# Patient Record
Sex: FEMALE | Race: ASIAN | Employment: OTHER | URBAN - METROPOLITAN AREA
[De-identification: names, ages, dates, MRNs, and addresses within clinical notes are randomized per-mention and may not be internally consistent; named-entity substitution may affect disease eponyms.]

---

## 2018-09-13 ENCOUNTER — HOSPITAL ENCOUNTER (OUTPATIENT)
Dept: PREADMISSION TESTING | Age: 78
Discharge: HOME | End: 2018-09-13
Payer: MEDICARE

## 2018-09-13 VITALS — BODY MASS INDEX: 19.24 KG/M2 | HEIGHT: 60 IN | WEIGHT: 98 LBS

## 2018-09-13 LAB
ALBUMIN SERPL-MCNC: 4.1 G/DL (ref 3.4–5)
ALBUMIN/GLOB SERPL: 1.1 {RATIO} (ref 0.8–1.7)
ALP SERPL-CCNC: 81 U/L (ref 45–117)
ALT SERPL-CCNC: 23 U/L (ref 13–56)
ANION GAP SERPL CALC-SCNC: 9 MMOL/L (ref 3–18)
APPEARANCE UR: ABNORMAL
APTT PPP: 34.5 SEC (ref 23–36.4)
AST SERPL-CCNC: 28 U/L (ref 15–37)
ATRIAL RATE: 75 BPM
BACTERIA SPEC CULT: NORMAL
BACTERIA URNS QL MICRO: ABNORMAL /HPF
BASOPHILS # BLD: 0 K/UL (ref 0–0.1)
BASOPHILS NFR BLD: 0 % (ref 0–2)
BILIRUB SERPL-MCNC: 0.3 MG/DL (ref 0.2–1)
BILIRUB UR QL: NEGATIVE
BUN SERPL-MCNC: 36 MG/DL (ref 7–18)
BUN/CREAT SERPL: 27 (ref 12–20)
CALCIUM SERPL-MCNC: 9.7 MG/DL (ref 8.5–10.1)
CALCULATED P AXIS, ECG09: 44 DEGREES
CALCULATED R AXIS, ECG10: 81 DEGREES
CALCULATED T AXIS, ECG11: 90 DEGREES
CHLORIDE SERPL-SCNC: 96 MMOL/L (ref 100–108)
CO2 SERPL-SCNC: 29 MMOL/L (ref 21–32)
COLOR UR: YELLOW
CREAT SERPL-MCNC: 1.34 MG/DL (ref 0.6–1.3)
DIAGNOSIS, 93000: NORMAL
DIFFERENTIAL METHOD BLD: ABNORMAL
EOSINOPHIL # BLD: 0.2 K/UL (ref 0–0.4)
EOSINOPHIL NFR BLD: 3 % (ref 0–5)
EPITH CASTS URNS QL MICRO: ABNORMAL /LPF (ref 0–5)
ERYTHROCYTE [DISTWIDTH] IN BLOOD BY AUTOMATED COUNT: 13 % (ref 11.6–14.5)
ERYTHROCYTE [SEDIMENTATION RATE] IN BLOOD: 70 MM/HR (ref 0–30)
EST. AVERAGE GLUCOSE BLD GHB EST-MCNC: 140 MG/DL
GLOBULIN SER CALC-MCNC: 3.9 G/DL (ref 2–4)
GLUCOSE SERPL-MCNC: 148 MG/DL (ref 74–99)
GLUCOSE UR STRIP.AUTO-MCNC: NEGATIVE MG/DL
HBA1C MFR BLD: 6.5 % (ref 4.5–5.6)
HCT VFR BLD AUTO: 28.5 % (ref 35–45)
HGB BLD-MCNC: 9.4 G/DL (ref 12–16)
HGB UR QL STRIP: NEGATIVE
INR PPP: 0.9 (ref 0.8–1.2)
KETONES UR QL STRIP.AUTO: NEGATIVE MG/DL
LEUKOCYTE ESTERASE UR QL STRIP.AUTO: ABNORMAL
LYMPHOCYTES # BLD: 2.3 K/UL (ref 0.9–3.6)
LYMPHOCYTES NFR BLD: 32 % (ref 21–52)
MCH RBC QN AUTO: 30.2 PG (ref 24–34)
MCHC RBC AUTO-ENTMCNC: 33 G/DL (ref 31–37)
MCV RBC AUTO: 91.6 FL (ref 74–97)
MONOCYTES # BLD: 0.5 K/UL (ref 0.05–1.2)
MONOCYTES NFR BLD: 7 % (ref 3–10)
NEUTS SEG # BLD: 4.1 K/UL (ref 1.8–8)
NEUTS SEG NFR BLD: 58 % (ref 40–73)
NITRITE UR QL STRIP.AUTO: POSITIVE
P-R INTERVAL, ECG05: 158 MS
PH UR STRIP: 8 [PH] (ref 5–8)
PLATELET # BLD AUTO: 293 K/UL (ref 135–420)
PMV BLD AUTO: 8.5 FL (ref 9.2–11.8)
POTASSIUM SERPL-SCNC: 5.1 MMOL/L (ref 3.5–5.5)
PROT SERPL-MCNC: 8 G/DL (ref 6.4–8.2)
PROT UR STRIP-MCNC: ABNORMAL MG/DL
PROTHROMBIN TIME: 11.8 SEC (ref 11.5–15.2)
Q-T INTERVAL, ECG07: 414 MS
QRS DURATION, ECG06: 120 MS
QTC CALCULATION (BEZET), ECG08: 462 MS
RBC # BLD AUTO: 3.11 M/UL (ref 4.2–5.3)
RBC #/AREA URNS HPF: ABNORMAL /HPF (ref 0–5)
SERVICE CMNT-IMP: NORMAL
SODIUM SERPL-SCNC: 134 MMOL/L (ref 136–145)
SP GR UR REFRACTOMETRY: 1.01 (ref 1–1.03)
UROBILINOGEN UR QL STRIP.AUTO: 0.2 EU/DL (ref 0.2–1)
VENTRICULAR RATE, ECG03: 75 BPM
WBC # BLD AUTO: 7.2 K/UL (ref 4.6–13.2)
WBC URNS QL MICRO: ABNORMAL /HPF (ref 0–5)
YEAST URNS QL MICRO: ABNORMAL

## 2018-09-13 PROCEDURE — 85610 PROTHROMBIN TIME: CPT | Performed by: ORTHOPAEDIC SURGERY

## 2018-09-13 PROCEDURE — 81001 URINALYSIS AUTO W/SCOPE: CPT | Performed by: ORTHOPAEDIC SURGERY

## 2018-09-13 PROCEDURE — 93005 ELECTROCARDIOGRAM TRACING: CPT

## 2018-09-13 PROCEDURE — 80053 COMPREHEN METABOLIC PANEL: CPT | Performed by: ORTHOPAEDIC SURGERY

## 2018-09-13 PROCEDURE — 85025 COMPLETE CBC W/AUTO DIFF WBC: CPT | Performed by: ORTHOPAEDIC SURGERY

## 2018-09-13 PROCEDURE — 85652 RBC SED RATE AUTOMATED: CPT | Performed by: ORTHOPAEDIC SURGERY

## 2018-09-13 PROCEDURE — 85730 THROMBOPLASTIN TIME PARTIAL: CPT | Performed by: ORTHOPAEDIC SURGERY

## 2018-09-13 PROCEDURE — 36415 COLL VENOUS BLD VENIPUNCTURE: CPT | Performed by: ORTHOPAEDIC SURGERY

## 2018-09-13 PROCEDURE — 87641 MR-STAPH DNA AMP PROBE: CPT | Performed by: ORTHOPAEDIC SURGERY

## 2018-09-13 PROCEDURE — 83036 HEMOGLOBIN GLYCOSYLATED A1C: CPT | Performed by: ORTHOPAEDIC SURGERY

## 2018-09-13 RX ORDER — CEFAZOLIN SODIUM/WATER 2 G/20 ML
2 SYRINGE (ML) INTRAVENOUS ONCE
Status: DISCONTINUED | OUTPATIENT
Start: 2018-09-13 | End: 2018-09-13

## 2018-09-13 RX ORDER — ACETAMINOPHEN 500 MG
500 TABLET ORAL
COMMUNITY

## 2018-09-13 RX ORDER — AMLODIPINE BESYLATE 5 MG/1
5 TABLET ORAL
COMMUNITY

## 2018-09-13 RX ORDER — CHOLECALCIFEROL TAB 125 MCG (5000 UNIT) 125 MCG
5000 TAB ORAL DAILY
COMMUNITY
End: 2018-10-25

## 2018-09-13 RX ORDER — ATORVASTATIN CALCIUM 40 MG/1
40 TABLET, FILM COATED ORAL
COMMUNITY

## 2018-09-13 RX ORDER — SODIUM CHLORIDE, SODIUM LACTATE, POTASSIUM CHLORIDE, CALCIUM CHLORIDE 600; 310; 30; 20 MG/100ML; MG/100ML; MG/100ML; MG/100ML
125 INJECTION, SOLUTION INTRAVENOUS CONTINUOUS
Status: CANCELLED | OUTPATIENT
Start: 2018-09-13

## 2018-09-13 RX ORDER — CEFAZOLIN SODIUM/WATER 2 G/20 ML
2 SYRINGE (ML) INTRAVENOUS ONCE
Status: CANCELLED | OUTPATIENT
Start: 2018-09-13 | End: 2018-09-13

## 2018-09-13 RX ORDER — BISMUTH SUBSALICYLATE 262 MG
1 TABLET,CHEWABLE ORAL DAILY
COMMUNITY

## 2018-09-13 RX ORDER — LOSARTAN POTASSIUM AND HYDROCHLOROTHIAZIDE 25; 100 MG/1; MG/1
1 TABLET ORAL DAILY
COMMUNITY

## 2018-09-13 NOTE — PERIOP NOTES
Offered interpretor and patient as well as daughter refused to use phone interpretor. Daughter did very well with helping pt. With language barrier and was told that at any time while at hospital she can or Mother (the patient ) can request an interpretor. They both said they understand and continued with PAT interview with daughter doing the translation.

## 2018-09-14 NOTE — PERIOP NOTES
Abnormal labs faxed to [address] [place] [address] office and Jackie Jack notified. Re: UA, creatinine, Hgb

## 2018-10-25 ENCOUNTER — HOSPITAL ENCOUNTER (OUTPATIENT)
Dept: PREADMISSION TESTING | Age: 78
Discharge: HOME | End: 2018-10-25
Payer: MEDICARE

## 2018-10-25 VITALS — WEIGHT: 98.25 LBS | HEIGHT: 60 IN | BODY MASS INDEX: 19.29 KG/M2

## 2018-10-25 LAB
ALBUMIN SERPL-MCNC: 3.9 G/DL (ref 3.4–5)
ALBUMIN/GLOB SERPL: 1 {RATIO} (ref 0.8–1.7)
ALP SERPL-CCNC: 78 U/L (ref 45–117)
ALT SERPL-CCNC: 12 U/L (ref 13–56)
ANION GAP SERPL CALC-SCNC: 7 MMOL/L (ref 3–18)
APPEARANCE UR: CLEAR
APTT PPP: 36.5 SEC (ref 23–36.4)
AST SERPL-CCNC: 17 U/L (ref 15–37)
BACTERIA SPEC CULT: NORMAL
BACTERIA URNS QL MICRO: ABNORMAL /HPF
BILIRUB SERPL-MCNC: 0.4 MG/DL (ref 0.2–1)
BILIRUB UR QL: NEGATIVE
BUN SERPL-MCNC: 27 MG/DL (ref 7–18)
BUN/CREAT SERPL: 25
CALCIUM SERPL-MCNC: 9.7 MG/DL (ref 8.5–10.1)
CHLORIDE SERPL-SCNC: 106 MMOL/L (ref 100–108)
CO2 SERPL-SCNC: 25 MMOL/L (ref 21–32)
COLOR UR: YELLOW
CREAT SERPL-MCNC: 1.09 MG/DL (ref 0.6–1.3)
EPITH CASTS URNS QL MICRO: ABNORMAL /LPF (ref 0–5)
ERYTHROCYTE [DISTWIDTH] IN BLOOD BY AUTOMATED COUNT: 13.8 % (ref 11.6–14.5)
ERYTHROCYTE [SEDIMENTATION RATE] IN BLOOD: 98 MM/HR (ref 0–30)
EST. AVERAGE GLUCOSE BLD GHB EST-MCNC: 128 MG/DL
GLOBULIN SER CALC-MCNC: 4.1 G/DL (ref 2–4)
GLUCOSE SERPL-MCNC: 187 MG/DL (ref 74–99)
GLUCOSE UR STRIP.AUTO-MCNC: NEGATIVE MG/DL
HBA1C MFR BLD: 6.1 % (ref 4.2–5.6)
HCT VFR BLD AUTO: 28.7 % (ref 35–45)
HGB BLD-MCNC: 9.4 G/DL (ref 12–16)
HGB UR QL STRIP: NEGATIVE
INR PPP: 1 (ref 0.8–1.2)
KETONES UR QL STRIP.AUTO: NEGATIVE MG/DL
LEUKOCYTE ESTERASE UR QL STRIP.AUTO: ABNORMAL
MCH RBC QN AUTO: 30.7 PG (ref 24–34)
MCHC RBC AUTO-ENTMCNC: 32.8 G/DL (ref 31–37)
MCV RBC AUTO: 93.8 FL (ref 74–97)
NITRITE UR QL STRIP.AUTO: POSITIVE
PH UR STRIP: 6.5 [PH] (ref 5–8)
PLATELET # BLD AUTO: 289 K/UL (ref 135–420)
PMV BLD AUTO: 9.2 FL (ref 9.2–11.8)
POTASSIUM SERPL-SCNC: 4.6 MMOL/L (ref 3.5–5.5)
PROT SERPL-MCNC: 8 G/DL (ref 6.4–8.2)
PROT UR STRIP-MCNC: NEGATIVE MG/DL
PROTHROMBIN TIME: 12.8 SEC (ref 11.5–15.2)
RBC # BLD AUTO: 3.06 M/UL (ref 4.2–5.3)
RBC #/AREA URNS HPF: NEGATIVE /HPF (ref 0–5)
SERVICE CMNT-IMP: NORMAL
SODIUM SERPL-SCNC: 138 MMOL/L (ref 136–145)
SP GR UR REFRACTOMETRY: 1.01 (ref 1–1.03)
UROBILINOGEN UR QL STRIP.AUTO: 0.2 EU/DL (ref 0.2–1)
WBC # BLD AUTO: 5.5 K/UL (ref 4.6–13.2)
WBC URNS QL MICRO: ABNORMAL /HPF (ref 0–5)

## 2018-10-25 PROCEDURE — 85610 PROTHROMBIN TIME: CPT | Performed by: ORTHOPAEDIC SURGERY

## 2018-10-25 PROCEDURE — 85652 RBC SED RATE AUTOMATED: CPT | Performed by: ORTHOPAEDIC SURGERY

## 2018-10-25 PROCEDURE — 87641 MR-STAPH DNA AMP PROBE: CPT | Performed by: ORTHOPAEDIC SURGERY

## 2018-10-25 PROCEDURE — 83036 HEMOGLOBIN GLYCOSYLATED A1C: CPT | Performed by: ORTHOPAEDIC SURGERY

## 2018-10-25 PROCEDURE — 81001 URINALYSIS AUTO W/SCOPE: CPT | Performed by: ORTHOPAEDIC SURGERY

## 2018-10-25 PROCEDURE — 80053 COMPREHEN METABOLIC PANEL: CPT | Performed by: ORTHOPAEDIC SURGERY

## 2018-10-25 PROCEDURE — 85027 COMPLETE CBC AUTOMATED: CPT | Performed by: ORTHOPAEDIC SURGERY

## 2018-10-25 PROCEDURE — 85730 THROMBOPLASTIN TIME PARTIAL: CPT | Performed by: ORTHOPAEDIC SURGERY

## 2018-10-25 RX ORDER — SODIUM CHLORIDE, SODIUM LACTATE, POTASSIUM CHLORIDE, CALCIUM CHLORIDE 600; 310; 30; 20 MG/100ML; MG/100ML; MG/100ML; MG/100ML
125 INJECTION, SOLUTION INTRAVENOUS CONTINUOUS
Status: CANCELLED | OUTPATIENT
Start: 2018-10-25

## 2018-10-25 NOTE — PERIOP NOTES
Denies any prostheticsChg wipes givenPatient states family physician is aware of upcoming procedure/surgeryDenies sleep apneaDenies family history of anesthesia complicationsDenies shortness of breath nor chest pain while climbing stairs

## 2018-10-31 NOTE — DISCHARGE INSTRUCTIONS
[facility]  Dr. Lianne House Post-Operative Instructions Total Hip Replacement    ACTIVITIES :  1. You may be up and walking about the house with your walker. 2.  Activities around the house, such as washing dishes, fixing light meals, and your own personal care are fine. 3.  Avoid strenuous activities, such as vacuuming, lifting laundry or grocery bags. 4.  Walking is the best way to rebuild strength and stamina. Start SLOWLY and gradually increase your distance. 5.  Avoid any jogging, running or excessive stair-climbing   6. Your home physical therapist will work with you for the first 7-14 days. 7.  Follow-up with Dr. Martir Collins in 10-14 days. BATHING and INCISION CARE:  1. The incision may be tender to touch or feel numb: this is normal.   2.  Keep the incision clean and dry no showering until your follow-up appointment. The incision will be closed with sutures under the skin and the skin will be glued. 3.  Do not apply any lotions, ointments or oils on the incision. 4.  If you notice any excessive swelling, redness, or persistent drainage around the incision, notify the office immediately. DRIVIN. You should not drive until after your follow-up appointment. 2.  You can be in a vehicle for short distances, but if you travel any long distance, please stop about every 30 minutes and walk/stretch. 3.  You should NEVER drive while taking narcotic medication. RETURN TO WORK :  1. The decision to return to work will be determined on an individual basis. 2.  Many people who have a strenuous job (construction, heavy labor, etc) may need to be off work for up to 12 weeks. 3.  If you need a work note, please let us know as soon as possible, and not the same day you are planning to return to work. NUTRITION :  1.  Good nutrition is an essential part of healing. 2.  You should eat a balanced diet each day, including fruits, vegetables, dairy products and protein. 3.  Remember to drink plenty of water. 4.  If you have not had a bowel movement within 3 days of surgery, you will need to use a laxative or suppository that can be obtained over-the-counter at your local pharmacy. MEDICATIONS -  1. You may resume the medications you were taking before surgery. 2.  You will receive a prescription for pain medication at discharge from the hospital. The pain medication works best if taken before the pain becomes severe. 3.  To reduce stomach upset, always take the medication with food. 4.  Begin to wean yourself off the pain medication during the second week after discharge. 5.  If you need a refill, please call the office during working hours at least 2 days before your prescription runs out. Do not wait until your bottle is empty to call for a refill. 6.  You will also be prescribed a blood thinner that you will take by injection for 21 days post-operatively. 7.  DO NOT drive if you are taking narcotic pain medications. HOME HEALTH CARE:  1.   A home health care service has been set-up for you to help assist you once you leave the hospital.  2.  They will contact you either before you leave the hospital or within 24 hours once you have been discharged home. 3. A nurse will assist you with your dressing changes and a Physical Therapist with help you with your therapy needs. CALL THE OFFICE:   If you have severe pain unrelieved by the medications;   If you have a fever of 101.0°F or greater;    If you notice excessive swelling, redness, or persistent drainage from the incision or IV site; The [facility] office number is [phone] from 8:00am to 5:00pm Monday through Friday. After 5:00pm, on weekends, or holidays, please leave a message with our answering service and the doctor on-call will get back to you shortly.

## 2018-10-31 NOTE — H&P
Patient Name:  Kenny South ([id number]) YOB: 1940 Chief Complaint:  Left hip pain. History of Chief Complaint:  Can Mcgee comes in for evaluation of the left hip. 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 Today she comes in for followup. Previously we talked about revising this to total hip arthroplasty and she subsequently had some difficulties with low sodium. She has been evaluated with a nephrologist and her primary care and she comes in after gaining clearances appropriately to continue plans for total hip arthroplasty. She is noted to have retained hardware of unknown origin of unknown manufacture and this would be an issue. She has cerclage wires in the proximal portion and has not had additional imaging in the proximal portion of the hip. She continues to have significant limitations of ambulation and ongoing and increasing pain seemingly worse since last evaluation as well and progressive leg length shortening on the left. Past Medical/Surgical History:   
Disease/Disorder Type Date Side Surgery Date Side Comment Arthritis Diabetes High cholesterol Hypertension Osteoporosis Spinal stenosis Surgery, lumbar spine Spinal fusion, cervical 04/2017 Hip replacement 2015 left Allergies:   
Ingredient Reaction Medication Name Comment NO KNOWN ALLERGIES Current Medications:   
Medication Directions Lipitor 40 mg tablet   
glimepiride 1 mg tablet   
metformin 500 mg tablet   
diclofenac potassium 50 mg tablet   
lisinopril 10 mg tablet   
omeprazole 40 mg capsule,delayed release   
gabapentin 300 mg capsule   
alendronate 35 mg tablet CALCIUM   
iron Vitamin B-12 Vitamin D3   
 Lovenox 40 mg/0.4 mL subcutaneous syringe inject 0.4 milliliter by subcutaneous route  every day Social History: SMOKING Status Tobacco Type Units Per Day Yrs Used Never smoker ALCOHOL There is no history of alcohol use. Family History:   
Disease Detail Family Member Age Cause of Death Comments Family history of Cancer Family history of Arthritis Family history of Diabetes Family history of High blood pressure Family history of Tuberculosis Review of Systems:    Pertinent positives include joint pain and joint stiffness. Pertinent negatives include chills, fever, weight change, loss of balance, muscle weakness and numbness/tingling. Vitals: 
Date BP Pulse Temp (F) Resp. (per min.) Height (Total in.) Weight (lbs.) BMI  
12/16/2016     60.00  20.51 Physical Examination: Psychiatric/General:  No acute distress; pleasant and accommodating. HEENT:  Moist mucous membranes intact. Lymphatic:  Neck is supple with no lymphadenopathy upon evaluation. Respiratory:   Equal bilateral chest wall movement with inspiration; no wheezes or strider audible. Cardiovascular:    Regular rate and rhythm, as noted by upper extremity pulses. Musculoskeletal: On evaluation of the left lower extremity, range of motion is limited, there is crepitus with range of motion and there is limitation of flexion and extension reproducing pain in the anterior portion of the groin, incision line and anterolateral aspect of the hip and thigh that is well healed. No significant focal swelling. Leg length discrepancy of approximately one inch compared to the contralateral side. Data/Radiograph Evaluation:    AP, pelvic, and lateral views of the left hip were obtained and interpreted in the office today and notes a nonunion, femoral neck collapse, cut outs, as well as penetration through the medial acetabulum.   Cerclage wires in the proximal portion with what appears to be some bony incorporation without obvious signs of additional malunion or nonunion. Assessment:   Lower extremity complaints as noted above. Recommendation:  I reviewed the findings and recommendations with her. We will continue with plans for revision of the left hip. Risks and benefits were reviewed with the patient and family who served as  including additional fracture. Other risks include infection, bleeding, deep venous thrombosis, pulmonary embolism, heart attack, stroke, death, arthrofibrosis, need for manipulation, neurovascular compromise, need for revision surgical intervention, persistent long-term pain, need for chronic pain management, and metallic allergies. We will move forward with hardware removal and conversion to total hip arthroplasty.          
 
Hiro Freed DO

## 2018-11-05 ENCOUNTER — ANESTHESIA EVENT (OUTPATIENT)
Dept: SURGERY | Age: 78
DRG: 470 | End: 2018-11-05
Payer: MEDICARE

## 2018-11-05 RX ORDER — DEXTROSE 50 % IN WATER (D50W) INTRAVENOUS SYRINGE
25 AS NEEDED
Status: CANCELLED | OUTPATIENT
Start: 2018-11-05

## 2018-11-05 RX ORDER — MAGNESIUM SULFATE 100 %
16 CRYSTALS MISCELLANEOUS AS NEEDED
Status: CANCELLED | OUTPATIENT
Start: 2018-11-05

## 2018-11-05 RX ORDER — INSULIN LISPRO 100 [IU]/ML
INJECTION, SOLUTION INTRAVENOUS; SUBCUTANEOUS ONCE
Status: CANCELLED | OUTPATIENT
Start: 2018-11-05 | End: 2018-11-05

## 2018-11-06 ENCOUNTER — APPOINTMENT (OUTPATIENT)
Dept: GENERAL RADIOLOGY | Age: 78
DRG: 470 | End: 2018-11-06
Attending: ORTHOPAEDIC SURGERY
Payer: MEDICARE

## 2018-11-06 ENCOUNTER — HOSPITAL ENCOUNTER (INPATIENT)
Age: 78
LOS: 3 days | Discharge: NURSING, REHAB OR LONG-TERM CARE | DRG: 470 | End: 2018-11-09
Attending: ORTHOPAEDIC SURGERY | Admitting: ORTHOPAEDIC SURGERY
Payer: MEDICARE

## 2018-11-06 ENCOUNTER — ANESTHESIA (OUTPATIENT)
Dept: SURGERY | Age: 78
DRG: 470 | End: 2018-11-06
Payer: MEDICARE

## 2018-11-06 DIAGNOSIS — Z96.642 S/P HIP REPLACEMENT, LEFT: Primary | ICD-10-CM

## 2018-11-06 PROBLEM — M16.52: Status: ACTIVE | Noted: 2018-11-06

## 2018-11-06 LAB
GLUCOSE BLD STRIP.AUTO-MCNC: 114 MG/DL (ref 70–110)
GLUCOSE BLD STRIP.AUTO-MCNC: 183 MG/DL (ref 70–110)
HCT VFR BLD AUTO: 28.2 % (ref 35–45)
HGB BLD-MCNC: 9.2 G/DL (ref 12–16)

## 2018-11-06 PROCEDURE — 97162 PT EVAL MOD COMPLEX 30 MIN: CPT

## 2018-11-06 PROCEDURE — C9290 INJ, BUPIVACAINE LIPOSOME: HCPCS | Performed by: ORTHOPAEDIC SURGERY

## 2018-11-06 PROCEDURE — 74011000258 HC RX REV CODE- 258: Performed by: ORTHOPAEDIC SURGERY

## 2018-11-06 PROCEDURE — 74011250637 HC RX REV CODE- 250/637: Performed by: SPECIALIST

## 2018-11-06 PROCEDURE — 77030031140 HC SUT VCRL3 J&J -A: Performed by: ORTHOPAEDIC SURGERY

## 2018-11-06 PROCEDURE — 77030012508 HC MSK AIRWY LMA AMBU -A: Performed by: ANESTHESIOLOGY

## 2018-11-06 PROCEDURE — 77030020268 HC MISC GENERAL SUPPLY: Performed by: ORTHOPAEDIC SURGERY

## 2018-11-06 PROCEDURE — 74011250636 HC RX REV CODE- 250/636

## 2018-11-06 PROCEDURE — 76210000006 HC OR PH I REC 0.5 TO 1 HR: Performed by: ORTHOPAEDIC SURGERY

## 2018-11-06 PROCEDURE — 85018 HEMOGLOBIN: CPT | Performed by: SPECIALIST

## 2018-11-06 PROCEDURE — 77030003666 HC NDL SPINAL BD -A: Performed by: ORTHOPAEDIC SURGERY

## 2018-11-06 PROCEDURE — 73551 X-RAY EXAM OF FEMUR 1: CPT

## 2018-11-06 PROCEDURE — 77030018846 HC SOL IRR STRL H20 ICUM -A: Performed by: ORTHOPAEDIC SURGERY

## 2018-11-06 PROCEDURE — 74011636637 HC RX REV CODE- 636/637: Performed by: ANESTHESIOLOGY

## 2018-11-06 PROCEDURE — 0SRB04Z REPLACEMENT OF LEFT HIP JOINT WITH CERAMIC ON POLYETHYLENE SYNTHETIC SUBSTITUTE, OPEN APPROACH: ICD-10-PCS | Performed by: ORTHOPAEDIC SURGERY

## 2018-11-06 PROCEDURE — 36415 COLL VENOUS BLD VENIPUNCTURE: CPT | Performed by: SPECIALIST

## 2018-11-06 PROCEDURE — 77030027138 HC INCENT SPIROMETER -A: Performed by: ORTHOPAEDIC SURGERY

## 2018-11-06 PROCEDURE — 74011250637 HC RX REV CODE- 250/637: Performed by: PHYSICIAN ASSISTANT

## 2018-11-06 PROCEDURE — 76010000133 HC OR TIME 3 TO 3.5 HR: Performed by: ORTHOPAEDIC SURGERY

## 2018-11-06 PROCEDURE — 77030034694 HC SCPL CANADY PLSM DISP USMD -E: Performed by: ORTHOPAEDIC SURGERY

## 2018-11-06 PROCEDURE — 77030018836 HC SOL IRR NACL ICUM -A: Performed by: ORTHOPAEDIC SURGERY

## 2018-11-06 PROCEDURE — 74011250636 HC RX REV CODE- 250/636: Performed by: ORTHOPAEDIC SURGERY

## 2018-11-06 PROCEDURE — 65270000029 HC RM PRIVATE

## 2018-11-06 PROCEDURE — 77030032490 HC SLV COMPR SCD KNE COVD -B: Performed by: ORTHOPAEDIC SURGERY

## 2018-11-06 PROCEDURE — 0QP704Z REMOVAL OF INTERNAL FIXATION DEVICE FROM LEFT UPPER FEMUR, OPEN APPROACH: ICD-10-PCS | Performed by: ORTHOPAEDIC SURGERY

## 2018-11-06 PROCEDURE — 86920 COMPATIBILITY TEST SPIN: CPT | Performed by: SPECIALIST

## 2018-11-06 PROCEDURE — 77030029099 HC BN WAX SSPC -A: Performed by: ORTHOPAEDIC SURGERY

## 2018-11-06 PROCEDURE — 74011000250 HC RX REV CODE- 250: Performed by: ORTHOPAEDIC SURGERY

## 2018-11-06 PROCEDURE — 77030013708 HC HNDPC SUC IRR PULS STRY –B: Performed by: ORTHOPAEDIC SURGERY

## 2018-11-06 PROCEDURE — 0SUB0KZ SUPPLEMENT LEFT HIP JOINT WITH NONAUTOLOGOUS TISSUE SUBSTITUTE, OPEN APPROACH: ICD-10-PCS | Performed by: ORTHOPAEDIC SURGERY

## 2018-11-06 PROCEDURE — 74011000258 HC RX REV CODE- 258

## 2018-11-06 PROCEDURE — 77030039267 HC ADH SKN EXOFIN S2SG -B: Performed by: ORTHOPAEDIC SURGERY

## 2018-11-06 PROCEDURE — 74011000250 HC RX REV CODE- 250

## 2018-11-06 PROCEDURE — 86850 RBC ANTIBODY SCREEN: CPT | Performed by: SPECIALIST

## 2018-11-06 PROCEDURE — 82962 GLUCOSE BLOOD TEST: CPT

## 2018-11-06 PROCEDURE — C1713 ANCHOR/SCREW BN/BN,TIS/BN: HCPCS | Performed by: ORTHOPAEDIC SURGERY

## 2018-11-06 PROCEDURE — 86920 COMPATIBILITY TEST SPIN: CPT

## 2018-11-06 PROCEDURE — 77030020782 HC GWN BAIR PAWS FLX 3M -B: Performed by: ORTHOPAEDIC SURGERY

## 2018-11-06 PROCEDURE — C1776 JOINT DEVICE (IMPLANTABLE): HCPCS | Performed by: ORTHOPAEDIC SURGERY

## 2018-11-06 PROCEDURE — 74011250636 HC RX REV CODE- 250/636: Performed by: PHYSICIAN ASSISTANT

## 2018-11-06 PROCEDURE — 77030038010: Performed by: ORTHOPAEDIC SURGERY

## 2018-11-06 PROCEDURE — 77030037875 HC DRSG MEPILEX <16IN BORD MOLN -A: Performed by: ORTHOPAEDIC SURGERY

## 2018-11-06 PROCEDURE — 77030031139 HC SUT VCRL2 J&J -A: Performed by: ORTHOPAEDIC SURGERY

## 2018-11-06 PROCEDURE — 74011250636 HC RX REV CODE- 250/636: Performed by: SPECIALIST

## 2018-11-06 PROCEDURE — 77010033678 HC OXYGEN DAILY

## 2018-11-06 PROCEDURE — 76060000037 HC ANESTHESIA 3 TO 3.5 HR: Performed by: ORTHOPAEDIC SURGERY

## 2018-11-06 DEVICE — PINNACLE HIP SOLUTIONS ALTRX POLYETHYLENE ACETABULAR LINER NEUTRAL 32MM ID 48MM OD
Type: IMPLANTABLE DEVICE | Site: HIP | Status: FUNCTIONAL
Brand: PINNACLE ALTRX

## 2018-11-06 DEVICE — PINNACLE GRIPTION ACETABULAR SHELL SECTOR 48MM OD
Type: IMPLANTABLE DEVICE | Site: HIP | Status: FUNCTIONAL
Brand: PINNACLE GRIPTION

## 2018-11-06 DEVICE — COMPONENT TOT HIP PRIMARY CERM ALTRX: Type: IMPLANTABLE DEVICE | Site: HIP | Status: FUNCTIONAL

## 2018-11-06 DEVICE — BIOLOX DELTA CERAMIC FEMORAL HEAD 32MM DIA +5.0 12/14 TAPER
Type: IMPLANTABLE DEVICE | Site: HIP | Status: FUNCTIONAL
Brand: BIOLOX DELTA

## 2018-11-06 DEVICE — DBM T44150 10CC ORTHOBLEND SMALL DEFGRAF
Type: IMPLANTABLE DEVICE | Site: HIP | Status: FUNCTIONAL
Brand: GRAFTON®AND GRAFTON PLUS®DEMINERALIZED BONE MATRIX (DBM)

## 2018-11-06 DEVICE — ACTIS DUOFIX HIP PROSTHESIS (FEMORAL STEM 12/14 TAPER CEMENTLESS SIZE 5 STD COLLAR)  CE
Type: IMPLANTABLE DEVICE | Site: HIP | Status: FUNCTIONAL
Brand: ACTIS

## 2018-11-06 RX ORDER — INSULIN LISPRO 100 [IU]/ML
INJECTION, SOLUTION INTRAVENOUS; SUBCUTANEOUS ONCE
Status: DISCONTINUED | OUTPATIENT
Start: 2018-11-06 | End: 2018-11-06 | Stop reason: HOSPADM

## 2018-11-06 RX ORDER — DOCUSATE SODIUM 100 MG/1
100 CAPSULE, LIQUID FILLED ORAL 2 TIMES DAILY
Status: DISCONTINUED | OUTPATIENT
Start: 2018-11-06 | End: 2018-11-09 | Stop reason: HOSPADM

## 2018-11-06 RX ORDER — GLYCOPYRROLATE 0.2 MG/ML
INJECTION INTRAMUSCULAR; INTRAVENOUS AS NEEDED
Status: DISCONTINUED | OUTPATIENT
Start: 2018-11-06 | End: 2018-11-06 | Stop reason: HOSPADM

## 2018-11-06 RX ORDER — CEFAZOLIN SODIUM/WATER 2 G/20 ML
2 SYRINGE (ML) INTRAVENOUS EVERY 8 HOURS
Status: COMPLETED | OUTPATIENT
Start: 2018-11-06 | End: 2018-11-07

## 2018-11-06 RX ORDER — SODIUM CHLORIDE, SODIUM LACTATE, POTASSIUM CHLORIDE, CALCIUM CHLORIDE 600; 310; 30; 20 MG/100ML; MG/100ML; MG/100ML; MG/100ML
100 INJECTION, SOLUTION INTRAVENOUS CONTINUOUS
Status: DISCONTINUED | OUTPATIENT
Start: 2018-11-06 | End: 2018-11-06 | Stop reason: HOSPADM

## 2018-11-06 RX ORDER — NEOSTIGMINE METHYLSULFATE 1 MG/ML
INJECTION, SOLUTION INTRAVENOUS AS NEEDED
Status: DISCONTINUED | OUTPATIENT
Start: 2018-11-06 | End: 2018-11-06 | Stop reason: HOSPADM

## 2018-11-06 RX ORDER — SODIUM CHLORIDE 0.9 % (FLUSH) 0.9 %
5-10 SYRINGE (ML) INJECTION AS NEEDED
Status: DISCONTINUED | OUTPATIENT
Start: 2018-11-06 | End: 2018-11-09 | Stop reason: HOSPADM

## 2018-11-06 RX ORDER — PROPOFOL 10 MG/ML
INJECTION, EMULSION INTRAVENOUS AS NEEDED
Status: DISCONTINUED | OUTPATIENT
Start: 2018-11-06 | End: 2018-11-06 | Stop reason: HOSPADM

## 2018-11-06 RX ORDER — ONDANSETRON 2 MG/ML
4 INJECTION INTRAMUSCULAR; INTRAVENOUS
Status: DISCONTINUED | OUTPATIENT
Start: 2018-11-06 | End: 2018-11-09 | Stop reason: HOSPADM

## 2018-11-06 RX ORDER — OXYCODONE HYDROCHLORIDE 5 MG/1
5 TABLET ORAL
Status: DISCONTINUED | OUTPATIENT
Start: 2018-11-06 | End: 2018-11-06 | Stop reason: HOSPADM

## 2018-11-06 RX ORDER — ONDANSETRON 2 MG/ML
INJECTION INTRAMUSCULAR; INTRAVENOUS AS NEEDED
Status: DISCONTINUED | OUTPATIENT
Start: 2018-11-06 | End: 2018-11-06 | Stop reason: HOSPADM

## 2018-11-06 RX ORDER — HYDROMORPHONE HYDROCHLORIDE 1 MG/ML
INJECTION, SOLUTION INTRAMUSCULAR; INTRAVENOUS; SUBCUTANEOUS AS NEEDED
Status: DISCONTINUED | OUTPATIENT
Start: 2018-11-06 | End: 2018-11-06 | Stop reason: HOSPADM

## 2018-11-06 RX ORDER — INSULIN LISPRO 100 [IU]/ML
2 INJECTION, SOLUTION INTRAVENOUS; SUBCUTANEOUS ONCE
Status: COMPLETED | OUTPATIENT
Start: 2018-11-06 | End: 2018-11-06

## 2018-11-06 RX ORDER — SODIUM CHLORIDE 0.9 % (FLUSH) 0.9 %
5-10 SYRINGE (ML) INJECTION EVERY 8 HOURS
Status: DISCONTINUED | OUTPATIENT
Start: 2018-11-06 | End: 2018-11-09 | Stop reason: HOSPADM

## 2018-11-06 RX ORDER — ROCURONIUM BROMIDE 10 MG/ML
INJECTION, SOLUTION INTRAVENOUS AS NEEDED
Status: DISCONTINUED | OUTPATIENT
Start: 2018-11-06 | End: 2018-11-06 | Stop reason: HOSPADM

## 2018-11-06 RX ORDER — PREGABALIN 25 MG/1
25 CAPSULE ORAL ONCE
Status: COMPLETED | OUTPATIENT
Start: 2018-11-06 | End: 2018-11-06

## 2018-11-06 RX ORDER — SODIUM CHLORIDE 0.9 % (FLUSH) 0.9 %
5-10 SYRINGE (ML) INJECTION AS NEEDED
Status: DISCONTINUED | OUTPATIENT
Start: 2018-11-06 | End: 2018-11-06 | Stop reason: HOSPADM

## 2018-11-06 RX ORDER — DEXAMETHASONE SODIUM PHOSPHATE 4 MG/ML
INJECTION, SOLUTION INTRA-ARTICULAR; INTRALESIONAL; INTRAMUSCULAR; INTRAVENOUS; SOFT TISSUE AS NEEDED
Status: DISCONTINUED | OUTPATIENT
Start: 2018-11-06 | End: 2018-11-06 | Stop reason: HOSPADM

## 2018-11-06 RX ORDER — HYDROMORPHONE HYDROCHLORIDE 2 MG/ML
1 INJECTION, SOLUTION INTRAMUSCULAR; INTRAVENOUS; SUBCUTANEOUS
Status: DISCONTINUED | OUTPATIENT
Start: 2018-11-06 | End: 2018-11-09 | Stop reason: HOSPADM

## 2018-11-06 RX ORDER — ATORVASTATIN CALCIUM 20 MG/1
40 TABLET, FILM COATED ORAL
Status: DISCONTINUED | OUTPATIENT
Start: 2018-11-06 | End: 2018-11-09 | Stop reason: HOSPADM

## 2018-11-06 RX ORDER — DEXTROSE 50 % IN WATER (D50W) INTRAVENOUS SYRINGE
25-50 AS NEEDED
Status: DISCONTINUED | OUTPATIENT
Start: 2018-11-06 | End: 2018-11-06 | Stop reason: HOSPADM

## 2018-11-06 RX ORDER — HYDROCHLOROTHIAZIDE 25 MG/1
25 TABLET ORAL DAILY
Status: DISCONTINUED | OUTPATIENT
Start: 2018-11-07 | End: 2018-11-09 | Stop reason: HOSPADM

## 2018-11-06 RX ORDER — SODIUM CHLORIDE, SODIUM LACTATE, POTASSIUM CHLORIDE, CALCIUM CHLORIDE 600; 310; 30; 20 MG/100ML; MG/100ML; MG/100ML; MG/100ML
125 INJECTION, SOLUTION INTRAVENOUS CONTINUOUS
Status: DISCONTINUED | OUTPATIENT
Start: 2018-11-06 | End: 2018-11-09 | Stop reason: HOSPADM

## 2018-11-06 RX ORDER — FENTANYL CITRATE 50 UG/ML
25 INJECTION, SOLUTION INTRAMUSCULAR; INTRAVENOUS AS NEEDED
Status: DISCONTINUED | OUTPATIENT
Start: 2018-11-06 | End: 2018-11-06 | Stop reason: HOSPADM

## 2018-11-06 RX ORDER — HYDROMORPHONE HYDROCHLORIDE 2 MG/ML
0.2 INJECTION, SOLUTION INTRAMUSCULAR; INTRAVENOUS; SUBCUTANEOUS
Status: DISCONTINUED | OUTPATIENT
Start: 2018-11-06 | End: 2018-11-06 | Stop reason: HOSPADM

## 2018-11-06 RX ORDER — LANOLIN ALCOHOL/MO/W.PET/CERES
1 CREAM (GRAM) TOPICAL 2 TIMES DAILY WITH MEALS
Status: DISCONTINUED | OUTPATIENT
Start: 2018-11-06 | End: 2018-11-09 | Stop reason: HOSPADM

## 2018-11-06 RX ORDER — ENOXAPARIN SODIUM 100 MG/ML
40 INJECTION SUBCUTANEOUS DAILY
Status: DISCONTINUED | OUTPATIENT
Start: 2018-11-07 | End: 2018-11-07

## 2018-11-06 RX ORDER — OXYCODONE HYDROCHLORIDE 5 MG/1
10 TABLET ORAL
Status: DISCONTINUED | OUTPATIENT
Start: 2018-11-06 | End: 2018-11-09 | Stop reason: HOSPADM

## 2018-11-06 RX ORDER — MAGNESIUM SULFATE 100 %
4 CRYSTALS MISCELLANEOUS AS NEEDED
Status: DISCONTINUED | OUTPATIENT
Start: 2018-11-06 | End: 2018-11-06 | Stop reason: HOSPADM

## 2018-11-06 RX ORDER — KETOROLAC TROMETHAMINE 30 MG/ML
INJECTION, SOLUTION INTRAMUSCULAR; INTRAVENOUS AS NEEDED
Status: DISCONTINUED | OUTPATIENT
Start: 2018-11-06 | End: 2018-11-06 | Stop reason: HOSPADM

## 2018-11-06 RX ORDER — LIDOCAINE HYDROCHLORIDE 20 MG/ML
INJECTION, SOLUTION EPIDURAL; INFILTRATION; INTRACAUDAL; PERINEURAL AS NEEDED
Status: DISCONTINUED | OUTPATIENT
Start: 2018-11-06 | End: 2018-11-06 | Stop reason: HOSPADM

## 2018-11-06 RX ORDER — NALOXONE HYDROCHLORIDE 0.4 MG/ML
0.4 INJECTION, SOLUTION INTRAMUSCULAR; INTRAVENOUS; SUBCUTANEOUS AS NEEDED
Status: DISCONTINUED | OUTPATIENT
Start: 2018-11-06 | End: 2018-11-09 | Stop reason: HOSPADM

## 2018-11-06 RX ORDER — AMLODIPINE BESYLATE 5 MG/1
5 TABLET ORAL
Status: DISCONTINUED | OUTPATIENT
Start: 2018-11-06 | End: 2018-11-09 | Stop reason: HOSPADM

## 2018-11-06 RX ORDER — MIDAZOLAM HYDROCHLORIDE 1 MG/ML
INJECTION, SOLUTION INTRAMUSCULAR; INTRAVENOUS AS NEEDED
Status: DISCONTINUED | OUTPATIENT
Start: 2018-11-06 | End: 2018-11-06 | Stop reason: HOSPADM

## 2018-11-06 RX ORDER — CELECOXIB 100 MG/1
200 CAPSULE ORAL 2 TIMES DAILY
Status: DISCONTINUED | OUTPATIENT
Start: 2018-11-06 | End: 2018-11-07

## 2018-11-06 RX ORDER — DIPHENHYDRAMINE HYDROCHLORIDE 50 MG/ML
12.5 INJECTION, SOLUTION INTRAMUSCULAR; INTRAVENOUS
Status: DISCONTINUED | OUTPATIENT
Start: 2018-11-06 | End: 2018-11-09 | Stop reason: HOSPADM

## 2018-11-06 RX ORDER — LOSARTAN POTASSIUM 50 MG/1
100 TABLET ORAL DAILY
Status: DISCONTINUED | OUTPATIENT
Start: 2018-11-07 | End: 2018-11-09 | Stop reason: HOSPADM

## 2018-11-06 RX ORDER — CEFAZOLIN SODIUM/WATER 2 G/20 ML
2 SYRINGE (ML) INTRAVENOUS ONCE
Status: COMPLETED | OUTPATIENT
Start: 2018-11-06 | End: 2018-11-06

## 2018-11-06 RX ORDER — ONDANSETRON 2 MG/ML
4 INJECTION INTRAMUSCULAR; INTRAVENOUS ONCE
Status: DISCONTINUED | OUTPATIENT
Start: 2018-11-06 | End: 2018-11-06 | Stop reason: HOSPADM

## 2018-11-06 RX ORDER — ACETAMINOPHEN 500 MG
1000 TABLET ORAL ONCE
Status: COMPLETED | OUTPATIENT
Start: 2018-11-06 | End: 2018-11-06

## 2018-11-06 RX ADMIN — Medication 2 G: at 13:26

## 2018-11-06 RX ADMIN — GLYCOPYRROLATE 0.4 MG: 0.2 INJECTION INTRAMUSCULAR; INTRAVENOUS at 16:00

## 2018-11-06 RX ADMIN — SODIUM CHLORIDE, SODIUM LACTATE, POTASSIUM CHLORIDE, AND CALCIUM CHLORIDE 125 ML/HR: 600; 310; 30; 20 INJECTION, SOLUTION INTRAVENOUS at 10:46

## 2018-11-06 RX ADMIN — HYDROMORPHONE HYDROCHLORIDE 0.5 MG: 1 INJECTION, SOLUTION INTRAMUSCULAR; INTRAVENOUS; SUBCUTANEOUS at 14:53

## 2018-11-06 RX ADMIN — TRANEXAMIC ACID 1 G: 100 INJECTION, SOLUTION INTRAVENOUS at 14:04

## 2018-11-06 RX ADMIN — SODIUM CHLORIDE, SODIUM LACTATE, POTASSIUM CHLORIDE, AND CALCIUM CHLORIDE 1000 ML: 600; 310; 30; 20 INJECTION, SOLUTION INTRAVENOUS at 10:46

## 2018-11-06 RX ADMIN — KETOROLAC TROMETHAMINE 15 MG: 30 INJECTION, SOLUTION INTRAMUSCULAR; INTRAVENOUS at 15:53

## 2018-11-06 RX ADMIN — INSULIN LISPRO 2 UNITS: 100 INJECTION, SOLUTION INTRAVENOUS; SUBCUTANEOUS at 17:35

## 2018-11-06 RX ADMIN — PREGABALIN 25 MG: 25 CAPSULE ORAL at 10:59

## 2018-11-06 RX ADMIN — ATORVASTATIN CALCIUM 40 MG: 20 TABLET, FILM COATED ORAL at 22:22

## 2018-11-06 RX ADMIN — ROCURONIUM BROMIDE 25 MG: 10 INJECTION, SOLUTION INTRAVENOUS at 13:58

## 2018-11-06 RX ADMIN — GLYCOPYRROLATE 0.3 MG: 0.2 INJECTION INTRAMUSCULAR; INTRAVENOUS at 13:34

## 2018-11-06 RX ADMIN — Medication 2 G: at 22:21

## 2018-11-06 RX ADMIN — SODIUM CHLORIDE, SODIUM LACTATE, POTASSIUM CHLORIDE, AND CALCIUM CHLORIDE: 600; 310; 30; 20 INJECTION, SOLUTION INTRAVENOUS at 16:08

## 2018-11-06 RX ADMIN — DEXAMETHASONE SODIUM PHOSPHATE 4 MG: 4 INJECTION, SOLUTION INTRA-ARTICULAR; INTRALESIONAL; INTRAMUSCULAR; INTRAVENOUS; SOFT TISSUE at 14:03

## 2018-11-06 RX ADMIN — AMLODIPINE BESYLATE 5 MG: 5 TABLET ORAL at 22:22

## 2018-11-06 RX ADMIN — NEOSTIGMINE METHYLSULFATE 2 MG: 1 INJECTION, SOLUTION INTRAVENOUS at 16:00

## 2018-11-06 RX ADMIN — ONDANSETRON 4 MG: 2 INJECTION INTRAMUSCULAR; INTRAVENOUS at 16:06

## 2018-11-06 RX ADMIN — SODIUM CHLORIDE, SODIUM LACTATE, POTASSIUM CHLORIDE, AND CALCIUM CHLORIDE 125 ML/HR: 600; 310; 30; 20 INJECTION, SOLUTION INTRAVENOUS at 22:42

## 2018-11-06 RX ADMIN — LIDOCAINE HYDROCHLORIDE 60 MG: 20 INJECTION, SOLUTION EPIDURAL; INFILTRATION; INTRACAUDAL; PERINEURAL at 13:19

## 2018-11-06 RX ADMIN — TRANEXAMIC ACID 1 G: 100 INJECTION, SOLUTION INTRAVENOUS at 16:08

## 2018-11-06 RX ADMIN — HYDROMORPHONE HYDROCHLORIDE 0.5 MG: 1 INJECTION, SOLUTION INTRAMUSCULAR; INTRAVENOUS; SUBCUTANEOUS at 13:26

## 2018-11-06 RX ADMIN — DOCUSATE SODIUM 100 MG: 100 CAPSULE, LIQUID FILLED ORAL at 22:21

## 2018-11-06 RX ADMIN — FENTANYL CITRATE 25 MCG: 50 INJECTION, SOLUTION INTRAMUSCULAR; INTRAVENOUS at 16:56

## 2018-11-06 RX ADMIN — ACETAMINOPHEN 1000 MG: 500 TABLET ORAL at 10:58

## 2018-11-06 RX ADMIN — FERROUS SULFATE TAB 325 MG (65 MG ELEMENTAL FE) 325 MG: 325 (65 FE) TAB at 22:32

## 2018-11-06 RX ADMIN — ROCURONIUM BROMIDE 10 MG: 10 INJECTION, SOLUTION INTRAVENOUS at 15:08

## 2018-11-06 RX ADMIN — CELECOXIB 200 MG: 100 CAPSULE ORAL at 22:22

## 2018-11-06 RX ADMIN — MIDAZOLAM HYDROCHLORIDE 2 MG: 1 INJECTION, SOLUTION INTRAMUSCULAR; INTRAVENOUS at 13:11

## 2018-11-06 RX ADMIN — PROPOFOL 150 MG: 10 INJECTION, EMULSION INTRAVENOUS at 13:19

## 2018-11-06 NOTE — INTERVAL H&P NOTE
H&P Update: 
Zeeshan Major was seen and examined. History and physical has been reviewed. The patient has been examined. There have been no significant clinical changes since the completion of the originally dated History and Physical. 
Patient identified by surgeon; surgical site was confirmed by patient and surgeon. martha Oh for removal of hardware and conversion to total hip Signed By: Ian Restrepo DO  November 6, 2018 11:27 AM

## 2018-11-06 NOTE — ANESTHESIA PREPROCEDURE EVALUATION
Anesthetic History No history of anesthetic complications Pertinent negatives: No PONV Comments: Multi-hour emergence Review of Systems / Medical History Patient summary reviewed, nursing notes reviewed and pertinent labs reviewed Pulmonary Pertinent negatives: No COPD, asthma and smoker Neuro/Psych Within defined limits Cardiovascular Hypertension: well controlled Pertinent negatives: No CAD and PAD 
 
  
GI/Hepatic/Renal 
  
 
 
Renal disease: CRI Pertinent negatives: No GERD and liver disease Endo/Other Diabetes: well controlled Arthritis Other Findings Comments: etoh neg Physical Exam 
 
Airway Mallampati: III 
TM Distance: 4 - 6 cm Neck ROM: decreased range of motion Mouth opening: Normal 
 
 Cardiovascular Dental 
 
Dentition: Edentulous Pulmonary Abdominal 
 
 
 
 Other Findings Anesthetic Plan ASA: 2 Anesthesia type: general 
 
 
 
 
Induction: Intravenous Anesthetic plan and risks discussed with: Patient

## 2018-11-06 NOTE — PERIOP NOTES
Called into OR 5 and spoke with Dillon Bhatti RN. Informed her that the patient had 3+ bacteria in her urine. The patient's daughter states that this is likely due to the fact that the patient didn't wipe her perineum sufficiently prior to providing the urine sample. Daughter states that the patient had a UTI recently and was treated, and now her symptoms have resolved. Dillon Bhatti spoke with 's PA who states that they don't treat unless the patient is symptomatic. No orders obtained at this time.

## 2018-11-06 NOTE — PERIOP NOTES
TRANSFER - OUT REPORT: 
 
Verbal report given to Elizabeth EASON on Jax Lee  being transferred to [address] [place] [address] for routine progression of care Report consisted of patients Situation, Background, Assessment and  
Recommendations(SBAR). Information from the following report(s) SBAR was reviewed with the receiving nurse. Lines:  
Peripheral IV 11/06/18 Left; Inner Forearm (Active) Site Assessment Clean, dry, & intact 11/6/2018  5:15 PM  
Phlebitis Assessment 0 11/6/2018  5:15 PM  
Infiltration Assessment 0 11/6/2018  5:15 PM  
Dressing Status Clean, dry, & intact 11/6/2018  5:15 PM  
Dressing Type Transparent;Tape 11/6/2018  5:15 PM  
Hub Color/Line Status Green;Patent; Infusing 11/6/2018  5:15 PM  
Action Taken Armboard 11/6/2018  3:22 PM  
Alcohol Cap Used No 11/6/2018 10:50 AM  
  
Visit Vitals /52 Pulse 76 Temp 98.5 °F (36.9 °C) Resp 21 Ht 5' (1.524 m) Wt 42.2 kg (93 lb 1 oz) SpO2 100% BMI 18.18 kg/m² Intake/Output Summary (Last 24 hours) at 11/6/2018 1755 Last data filed at 11/6/2018 1744 Gross per 24 hour Intake 1250 ml Output 400 ml Net 850 ml Opportunity for questions and clarification was provided. Patient transported with: 
 O2 @ 2 liters Registered Nurse Nubia De Los Santos RN

## 2018-11-06 NOTE — PERIOP NOTES
Bedside report to receiving nurse Elizabeth EASON, current vital signs noted below. Dressing dry and intact. Family in patient room. No further questions from receiving nurse. Skin assessment completed. Visit Vitals /72 (BP 1 Location: Right arm, BP Patient Position: At rest) Pulse 72 Temp 97.3 °F (36.3 °C) Resp 18 Ht 5' (1.524 m) Wt 42.2 kg (93 lb 1 oz) SpO2 100% BMI 18.18 kg/m² Katiana Murphy RN

## 2018-11-06 NOTE — ANESTHESIA POSTPROCEDURE EVALUATION
Post-Anesthesia Evaluation and Assessment Cardiovascular Function/Vital Signs Visit Vitals /52 Pulse 76 Temp 36.9 °C (98.5 °F) Resp 21 Ht 5' (1.524 m) Wt 42.2 kg (93 lb 1 oz) SpO2 100% BMI 18.18 kg/m² Patient is status post Procedure(s): LEFT TOTAL HIP ARTHROPLASTY ANTERIOR APPROACH AND REMOVAL OF EXISTING HARDWARE WITH C-ARM, \"SPEC POP\" . Nausea/Vomiting: Controlled. Postoperative hydration reviewed and adequate. Pain: 
Pain Scale 1: Numeric (0 - 10) (11/06/18 1725) Pain Intensity 1: 2 (11/06/18 1725) Managed. Neurological Status:  
Neuro (WDL): Exceptions to WDL (11/06/18 1715) At baseline. Mental Status and Level of Consciousness: Arousable. Pulmonary Status:  
O2 Device: Nasal cannula (11/06/18 1702) Adequate oxygenation and airway patent. Complications related to anesthesia: None Post-anesthesia assessment completed. No concerns. Patient has met all discharge requirements. Signed By: Alexis Parham MD  
 November 6, 2018

## 2018-11-07 LAB
CREAT SERPL-MCNC: 1.2 MG/DL (ref 0.6–1.3)
GLUCOSE BLD STRIP.AUTO-MCNC: 173 MG/DL (ref 70–110)
GLUCOSE BLD STRIP.AUTO-MCNC: 198 MG/DL (ref 70–110)
GLUCOSE BLD STRIP.AUTO-MCNC: 236 MG/DL (ref 70–110)
GLUCOSE BLD STRIP.AUTO-MCNC: 83 MG/DL (ref 70–110)
HCT VFR BLD AUTO: 19.2 % (ref 35–45)
HGB BLD-MCNC: 6.3 G/DL (ref 12–16)

## 2018-11-07 PROCEDURE — 82962 GLUCOSE BLOOD TEST: CPT

## 2018-11-07 PROCEDURE — 74011250636 HC RX REV CODE- 250/636: Performed by: PHYSICIAN ASSISTANT

## 2018-11-07 PROCEDURE — 74011636637 HC RX REV CODE- 636/637: Performed by: ORTHOPAEDIC SURGERY

## 2018-11-07 PROCEDURE — 36430 TRANSFUSION BLD/BLD COMPNT: CPT

## 2018-11-07 PROCEDURE — 85018 HEMOGLOBIN: CPT | Performed by: ORTHOPAEDIC SURGERY

## 2018-11-07 PROCEDURE — 36415 COLL VENOUS BLD VENIPUNCTURE: CPT | Performed by: ORTHOPAEDIC SURGERY

## 2018-11-07 PROCEDURE — 82565 ASSAY OF CREATININE: CPT | Performed by: ORTHOPAEDIC SURGERY

## 2018-11-07 PROCEDURE — 65270000029 HC RM PRIVATE

## 2018-11-07 PROCEDURE — 74011250636 HC RX REV CODE- 250/636: Performed by: ORTHOPAEDIC SURGERY

## 2018-11-07 PROCEDURE — 74011250637 HC RX REV CODE- 250/637: Performed by: PHYSICIAN ASSISTANT

## 2018-11-07 PROCEDURE — 97116 GAIT TRAINING THERAPY: CPT

## 2018-11-07 PROCEDURE — P9016 RBC LEUKOCYTES REDUCED: HCPCS | Performed by: SPECIALIST

## 2018-11-07 PROCEDURE — 77010033678 HC OXYGEN DAILY

## 2018-11-07 PROCEDURE — 30233N1 TRANSFUSION OF NONAUTOLOGOUS RED BLOOD CELLS INTO PERIPHERAL VEIN, PERCUTANEOUS APPROACH: ICD-10-PCS | Performed by: PHYSICIAN ASSISTANT

## 2018-11-07 RX ORDER — DOCUSATE SODIUM 100 MG/1
100 CAPSULE, LIQUID FILLED ORAL 2 TIMES DAILY
Qty: 60 CAP | Refills: 0 | Status: SHIPPED | OUTPATIENT
Start: 2018-11-07 | End: 2018-12-07

## 2018-11-07 RX ORDER — CELECOXIB 200 MG/1
200 CAPSULE ORAL 2 TIMES DAILY
Qty: 60 CAP | Refills: 0 | Status: SHIPPED | OUTPATIENT
Start: 2018-11-07 | End: 2018-11-07

## 2018-11-07 RX ORDER — OXYCODONE HYDROCHLORIDE 10 MG/1
10 TABLET ORAL
Qty: 54 TAB | Refills: 0 | Status: SHIPPED | OUTPATIENT
Start: 2018-11-07

## 2018-11-07 RX ORDER — MAGNESIUM SULFATE 100 %
16 CRYSTALS MISCELLANEOUS AS NEEDED
Status: DISCONTINUED | OUTPATIENT
Start: 2018-11-07 | End: 2018-11-09 | Stop reason: HOSPADM

## 2018-11-07 RX ORDER — ENOXAPARIN SODIUM 100 MG/ML
30 INJECTION SUBCUTANEOUS DAILY
Status: DISCONTINUED | OUTPATIENT
Start: 2018-11-08 | End: 2018-11-09 | Stop reason: HOSPADM

## 2018-11-07 RX ORDER — ENOXAPARIN SODIUM 100 MG/ML
40 INJECTION SUBCUTANEOUS DAILY
Qty: 21 SYRINGE | Refills: 0 | Status: SHIPPED | OUTPATIENT
Start: 2018-11-08 | End: 2018-11-07

## 2018-11-07 RX ORDER — DEXTROSE 50 % IN WATER (D50W) INTRAVENOUS SYRINGE
25-50 AS NEEDED
Status: DISCONTINUED | OUTPATIENT
Start: 2018-11-07 | End: 2018-11-09 | Stop reason: HOSPADM

## 2018-11-07 RX ORDER — INSULIN LISPRO 100 [IU]/ML
INJECTION, SOLUTION INTRAVENOUS; SUBCUTANEOUS
Status: DISCONTINUED | OUTPATIENT
Start: 2018-11-07 | End: 2018-11-09 | Stop reason: HOSPADM

## 2018-11-07 RX ORDER — DOCUSATE SODIUM 100 MG/1
100 CAPSULE, LIQUID FILLED ORAL 2 TIMES DAILY
Qty: 60 CAP | Refills: 0 | Status: SHIPPED | OUTPATIENT
Start: 2018-11-07 | End: 2018-11-07

## 2018-11-07 RX ORDER — SODIUM CHLORIDE 9 MG/ML
250 INJECTION, SOLUTION INTRAVENOUS AS NEEDED
Status: DISCONTINUED | OUTPATIENT
Start: 2018-11-07 | End: 2018-11-09 | Stop reason: HOSPADM

## 2018-11-07 RX ADMIN — CELECOXIB 200 MG: 100 CAPSULE ORAL at 08:14

## 2018-11-07 RX ADMIN — FERROUS SULFATE TAB 325 MG (65 MG ELEMENTAL FE) 325 MG: 325 (65 FE) TAB at 08:14

## 2018-11-07 RX ADMIN — DOCUSATE SODIUM 100 MG: 100 CAPSULE, LIQUID FILLED ORAL at 21:27

## 2018-11-07 RX ADMIN — INSULIN LISPRO 2 UNITS: 100 INJECTION, SOLUTION INTRAVENOUS; SUBCUTANEOUS at 08:12

## 2018-11-07 RX ADMIN — INSULIN LISPRO 4 UNITS: 100 INJECTION, SOLUTION INTRAVENOUS; SUBCUTANEOUS at 18:04

## 2018-11-07 RX ADMIN — ENOXAPARIN SODIUM 40 MG: 40 INJECTION SUBCUTANEOUS at 05:17

## 2018-11-07 RX ADMIN — DOCUSATE SODIUM 100 MG: 100 CAPSULE, LIQUID FILLED ORAL at 08:13

## 2018-11-07 RX ADMIN — LOSARTAN POTASSIUM 100 MG: 50 TABLET ORAL at 08:13

## 2018-11-07 RX ADMIN — Medication 10 ML: at 06:51

## 2018-11-07 RX ADMIN — OXYCODONE HYDROCHLORIDE 10 MG: 5 TABLET ORAL at 03:48

## 2018-11-07 RX ADMIN — ATORVASTATIN CALCIUM 40 MG: 20 TABLET, FILM COATED ORAL at 21:27

## 2018-11-07 RX ADMIN — Medication 2 G: at 05:17

## 2018-11-07 RX ADMIN — OXYCODONE HYDROCHLORIDE 10 MG: 5 TABLET ORAL at 21:36

## 2018-11-07 RX ADMIN — AMLODIPINE BESYLATE 5 MG: 5 TABLET ORAL at 21:27

## 2018-11-07 RX ADMIN — HYDROCHLOROTHIAZIDE 25 MG: 25 TABLET ORAL at 08:13

## 2018-11-07 RX ADMIN — SODIUM CHLORIDE, SODIUM LACTATE, POTASSIUM CHLORIDE, AND CALCIUM CHLORIDE 125 ML/HR: 600; 310; 30; 20 INJECTION, SOLUTION INTRAVENOUS at 05:23

## 2018-11-07 RX ADMIN — FERROUS SULFATE TAB 325 MG (65 MG ELEMENTAL FE) 325 MG: 325 (65 FE) TAB at 18:05

## 2018-11-07 NOTE — PROGRESS NOTES
Problem: Falls - Risk of 
Goal: *Absence of Falls Document Gayle Simpson Fall Risk and appropriate interventions in the flowsheet. Outcome: Progressing Towards Goal 
Fall Risk Interventions: 
Mobility Interventions: Utilize walker, cane, or other assistive device Mentation Interventions: Adequate sleep, hydration, pain control Medication Interventions: Patient to call before getting OOB Elimination Interventions: Call light in reach

## 2018-11-07 NOTE — PROGRESS NOTES
Problem: Mobility Impaired (Adult and Pediatric) Goal: *Acute Goals and Plan of Care (Insert Text) Goals to be addressed in 1-4 days: STG 
1. Rolling L to R to L modified independent for positioning. 2. Supine to sit to supine S with HR for meals. 3. Sit to stand to sit S with RW in prep for ambulation. LTG 1. Ambulate >150ft S with RW, WBAT, for home/community mobility. 2. Ascend/descend a >3 stair steps CGA with HR for home entry. 3. Tolerate up in chair 1-2 hours for ADLs. 4. Patient/family to be independent with HEP for follow-up care and safe discharge. Outcome: Progressing Towards Goal 
physical Therapy EVALUATION Patient: Jacque Jackson (3 y.o. female) Date: 11/6/2018 Primary Diagnosis: OSTEOARTHRITIS LEFT HIP Post-traumatic osteoarthritis of one hip, left Procedure(s) (LRB): LEFT TOTAL HIP ARTHROPLASTY ANTERIOR APPROACH AND REMOVAL OF EXISTING HARDWARE WITH C-ARM, \"SPEC POP\"  (Left) Day of Surgery Precautions:   Fall, WBAT 
 
ASSESSMENT : 
Based on the objective data described below, the patient presents with lower extremity weakness, decreased gait quality and endurance, impaired bed mobility and transfers, decreased L hip ROM/flexibility, and overall limitations in functional mobility s/p L THR AA. Pt performed supine to sit with Min-ModA, sit to stand with David. Patient ambulated 3 side steps with RW, GB applied, David; gait duration limited by drowsiness. Patient would benefit from skilled inpatient physical therapy to address deficits, progress as tolerated to achieve long term goals and allow safe discharge. Patient will benefit from skilled intervention to address the above impairments. Patients rehabilitation potential is considered to be Good Factors which may influence rehabilitation potential include:  
[]         None noted 
[]         Mental ability/status [x]         Medical condition 
[]         Home/family situation and support systems 
[]         Safety awareness [x]         Pain tolerance/management 
[]         Other: PLAN : 
Recommendations and Planned Interventions: 
[x]           Bed Mobility Training             [x]    Neuromuscular Re-Education 
[x]           Transfer Training                   []    Orthotic/Prosthetic Training 
[x]           Gait Training                          []    Modalities [x]           Therapeutic Exercises          []    Edema Management/Control 
[x]           Therapeutic Activities            [x]    Patient and Family Training/Education 
[]           Other (comment): Frequency/Duration: Patient will be followed by physical therapy twice daily to address goals. Discharge Recommendations: Terrell Ahn Further Equipment Recommendations for Discharge: N/A  
 
SUBJECTIVE:  
Patient stated What do you want me to do.  OBJECTIVE DATA SUMMARY:  
 
Past Medical History:  
Diagnosis Date  Arthritis  Chronic kidney disease   
 stage 3  
 Diabetes ([facility] [address] 2000  Hypertension 2014 Past Surgical History:  
Procedure Laterality Date  HX CERVICAL FUSION    
 HX LUMBAR FUSION    
 TOTAL HIP ARTHROPLASTY  2014  
 left Barriers to Learning/Limitations: yes;  language Compensate with: Visual Cues, Verbal Cues and Tactile Cues Prior Level of Function/Home Situation: Independent amb s/AD Home Situation Home Environment: Private residence # Steps to Enter: 3 Rails to Enter: Yes Hand Rails : Bilateral 
One/Two Story Residence: Two story # of Interior Steps: 12 Interior Rails: Left Lift Chair Available: No 
Living Alone: Yes Support Systems: Child(cathryn) Patient Expects to be Discharged to[de-identified] Rehabilitation facility Current DME Used/Available at Home: david TadeoCribharti Behavior: 
Neurologic State: Drowsy Skin Condition/Temp: Dry;Warm 
Skin Integrity: Incision (comment)(to left hip) Skin Integumentary Skin Color: Appropriate for ethnicity Skin Condition/Temp: Dry;Warm 
 Skin Integrity: Incision (comment)(to left hip) Turgor: Non-tenting Strength:   
Strength: Generally decreased, functional 
Tone & Sensation:  
Tone: Normal 
Sensation: Impaired Range Of Motion: 
AROM: Generally decreased, functional 
PROM: Generally decreased, functional 
Functional Mobility: 
Bed Mobility: 
Supine to Sit: Minimum assistance; Moderate assistance Sit to Supine: Minimum assistance Transfers: 
Sit to Stand: Minimum assistance Stand to Sit: Minimum assistance Balance:  
Sitting: Intact Standing: Impaired; With support Standing - Static: Good Standing - Dynamic : FairAmbulation/Gait Training: 
Distance (ft): 3 Feet (ft)(side steps) Assistive Device: Gait belt;Walker, rolling Ambulation - Level of Assistance: Minimal assistance Gait Description (WDL): Exceptions to [facility] Gait Abnormalities: Decreased step clearance; Antalgic Left Side Weight Bearing: As tolerated Base of Support: Shift to right Stance: Left decreased Speed/Donna: Slow Step Length: Left shortened;Right shortened Pain: 
Pain Scale 1: Numeric (0 - 10) Pain Intensity 1: 0 Pain Location 1: Hip Pain Orientation 1: Left Pain Description 1: Throbbing Pain Intervention(s) 1: Declines; Ice Activity Tolerance:  
Good Please refer to the flowsheet for vital signs taken during this treatment. After treatment:  
[]         Patient left in no apparent distress sitting up in chair 
[x]         Patient left in no apparent distress in bed 
[x]         Call bell left within reach [x]         Nursing notified 
[]         Caregiver present 
[]         Bed alarm activated COMMUNICATION/EDUCATION:  
[x]         Fall prevention education was provided and the patient/caregiver indicated understanding. [x]         Patient/family have participated as able in goal setting and plan of care. [x]         Patient/family agree to work toward stated goals and plan of care. []         Patient understands intent and goals of therapy, but is neutral about his/her participation. []         Patient is unable to participate in goal setting and plan of care. Thank you for this referral. 
Sherif Regan Time Calculation: 12 mins Eval Complexity: History: MEDIUM  Complexity : 1-2 comorbidities / personal factors will impact the outcome/ POC Exam:LOW Complexity : 1-2 Standardized tests and measures addressing body structure, function, activity limitation and / or participation in recreation  Presentation: MEDIUM Complexity : Evolving with changing characteristics  Clinical Decision Making:Medium Complexity amb <30 ft c/RW, Min-ModA Overall Complexity:MEDIUM Mobility  Current  CK= 40-59%   Goal  CI= 1-19%. The severity rating is based on the Level of Assistance required for Functional Mobility and ADLs.

## 2018-11-07 NOTE — ROUTINE PROCESS
Bedside and Verbal shift change report given to YUMI Truong by Keiko Lizarraga. Report included the following information SBAR, Kardex, OR Summary, Intake/Output and MAR.

## 2018-11-07 NOTE — PROGRESS NOTES
1930 - Bedside report received from [facility], [address] [place] [address]. Patient in bed. Pain 0/10.  
 
2225 - Patient in bed at this time. IV to L FA  intact and patent. Sequential compression device bilaterally. TEDs to BLE. Dressing to L hip x 2 CDI. [place] patent with cl. Yellow urine. + CMS. Pt A & O x 4. LS clear, on RA. Abdomen soft, NT and ND. + BS to all 4 quadrants. Denies nausea. Pain 0/10. Call light within reach. Medications given, with assistance with the daughter who helped with explanations/translation per the mother's request. Pt speaks [place], very little [place]. Potential side effects explained to patient, patient verbalizes understanding, opportunities for questions provided. Patient stable, No apparent distress at this time, bed in locked position, call bell and phone within reach. 0348-Medications given. Potential side effects explained to patient, patient verbalizes understanding, opportunities for questions provided. Patient stable, No apparent distress at this time, bed in locked position, call bell and phone within reach. 0645-[place] D/C'd per orders. Pt candi. Well, fluids enc. Pt aware to call staff for BR assistance. Pt had uneventful shift. Uses IS every hour while awake. Pt ambulated with assistance with a walker. Pain remained well-controlled with medication. No issues/concerns at this time. Call bell within reach 
Pt's daughter at bedside to help with interpretation.

## 2018-11-07 NOTE — PROGRESS NOTES
Problem: Mobility Impaired (Adult and Pediatric) Goal: *Acute Goals and Plan of Care (Insert Text) Goals to be addressed in 1-4 days: STG 
1. Rolling L to R to L modified independent for positioning. 2. Supine to sit to supine S with HR for meals. 3. Sit to stand to sit S with RW in prep for ambulation. LTG 1. Ambulate >150ft S with RW, WBAT, for home/community mobility. 2. Ascend/descend a >3 stair steps CGA with HR for home entry. 3. Tolerate up in chair 1-2 hours for ADLs. 4. Patient/family to be independent with HEP for follow-up care and safe discharge. Outcome: Progressing Towards Goal 
physical Therapy TREATMENT Patient: Anderson Morales (15 y.o. female) Date: 11/7/2018 Diagnosis: OSTEOARTHRITIS LEFT HIP Post-traumatic osteoarthritis of one hip, left <principal problem not specified> Procedure(s) (LRB): LEFT TOTAL HIP ARTHROPLASTY ANTERIOR APPROACH AND REMOVAL OF EXISTING HARDWARE WITH C-ARM, \"SPEC POP\"  (Left) 1 Day Post-Op Precautions: Fall, WBAT Chart, physical therapy assessment, plan of care and goals were reviewed. ASSESSMENT: 
Pt seen in supine prior to session w/ IV connected and B/L SCDs donned. Pt reported having pain in L hip prior to session but unable to give a numeric number. Pt's daughter present at bedside to assist w/ translation if needed. Pt is impulsive during session a requires a lot of VCing to maintain safety, however this may be possible secondary to language barrier. Pt able to increase in gait goal but demonstrates toe touch in the initial phase of gait secondary to increase pain. Pt transferred back to room where pt performed therex activity w/ min VCing. Pt left in supine after session, call bell and tray in reach, nurse notified after session. Progression toward goals: 
[x]      Improving appropriately and progressing toward goals 
[]      Improving slowly and progressing toward goals []      Not making progress toward goals and plan of care will be adjusted PLAN: 
Patient continues to benefit from skilled intervention to address the above impairments. Continue treatment per established plan of care. Discharge Recommendations:  Rehab Further Equipment Recommendations for Discharge:  N/A  
 
SUBJECTIVE:  
Patient stated I hurt right here.  (pointing to her L hip) OBJECTIVE DATA SUMMARY:  
Critical Behavior: 
Neurologic State: Alert, Appropriate for age Functional Mobility Training: 
Bed Mobility: 
Supine to Sit: Supervision Sit to Supine: Supervision Scooting: Supervision Transfers: 
Sit to Stand: Supervision;Contact guard assistance Stand to Sit: Supervision;Contact guard assistance Balance: 
Sitting: Intact Standing: Intact; With support Standing - Static: Good Standing - Dynamic : FairAmbulation/Gait Training: 
Distance (ft): 120 Feet (ft) Assistive Device: Gait belt;Walker, rolling Ambulation - Level of Assistance: Contact guard assistance Gait Abnormalities: Antalgic;Decreased step clearance; Step to gait; Toe walking Left Side Weight Bearing: As tolerated Base of Support: Shift to right Stance: Left decreased Speed/Donna: Fluctuations Step Length: Left shortened;Right shortened Therapeutic Exercises:  
 
 
EXERCISE Sets Reps Active Active Assist  
Passive Self ROM Comments Ankle Pumps 1 10  [x] [] [] [] Quad Sets/Glut Sets   [] [] [] [] Hamstring Sets   [] [] [] [] Short Arc Quads   [] [] [] [] Heel Slides   [] [] [] [] Straight Leg Raises   [] [] [] [] Hip Add 1 10 [x] [] [] [] Hold for 5 secs Long Arc Quads 1 10 [x] [] [] [] Seated Marching   [] [] [] []   
Standing Marching   [] [] [] []   
   [] [] [] []   
 
Pain: 
Pain Scale 1: Adult Nonverbal Pain Scale Pain Intensity 1: 5 Pain Location 1: Hip Pain Orientation 1: Left Pain Description 1: Other (comment)(unable to describe) Pain Intervention(s) 1: Medication (see MAR) Activity Tolerance:  
Good Please refer to the flowsheet for vital signs taken during this treatment. After treatment:  
[] Patient left in no apparent distress sitting up in chair 
[x] Patient left in no apparent distress in bed 
[x] Call bell left within reach [x] Nursing notified 
[x] Caregiver present (Daughter) [] Bed alarm activated Salo Swain PT Time Calculation: 17 mins

## 2018-11-07 NOTE — ROUTINE PROCESS
2392 - Bedside report received from [facility] 25. Patient in bed at this time. Pain 0/10. Plan of care for the day addressed with the patient. Will continue to monitor.

## 2018-11-07 NOTE — PROGRESS NOTES
Progress Note Patient: Chalo Green               Sex: female          DOA: 11/6/2018 YOB: 1940      Age:  66 y.o.        LOS:  LOS: 1 day Status Post: Procedure(s): LEFT TOTAL HIP ARTHROPLASTY ANTERIOR APPROACH AND REMOVAL OF EXISTING HARDWARE WITH C-ARM, \"SPEC POP\" Surgery Date: 11/6/2018 Subjective:  
 
Chalo Green is a 66 y.o. female who c/o left hip pain reasonably well controlled with prn pain meds. Patient denies any complaint of calf pain/ SOB or difficulty breathing. Objective:  
  
Visit Vitals /63 Pulse 74 Temp 97.9 °F (36.6 °C) Resp 16 Ht 5' (1.524 m) Wt 42.2 kg (93 lb 1 oz) SpO2 100% BMI 18.18 kg/m² Physical Exam:  
Dressing:  clean, dry, intact Wiggles Toes/Ankle Foot sensation intact to light touch No foot edema/ +1 Posterior Tibial Pulse Leg Lengths appear equal 
 
Xray - good Intake and Output: 
Current Shift:  No intake/output data recorded. Last three shifts:  11/05 1901 - 11/07 0700 In: 9493 [P.O.:1140; I.V.:2703] Out: 2500 [Urine:2400] Voiding Status:  + void without need for [place] catheter Lab/Data Reviewed: 
Recent Labs 11/07/18 
0250 HGB 6.3* HCT 19.2*  
CREA 1.20 Medications Reviewed Assessment/Plan Active Problems: 
  Post-traumatic osteoarthritis of one hip, left (11/6/2018) · Discontinue Oxygen. · Change IV to Saline Lock. · ABL anemia - Hgb 6.3 - transfuse 1 unit PRBC · Discharge Planning for home versus rehab · Begin DVT Prophylaxis - Lovenox 40 mg SQ daily · Discharge home today if cleared by physical therapy. The patient was seen and examined by Dr. Mikal Girard today as well and he is in agreement with above.

## 2018-11-07 NOTE — PROGRESS NOTES
26 - Patient arrives to unit at this time. Admission completed at this time. Patient is A/O x 4. IV to left forearm intact and patent. SCDs and TEDs applied to bilateral legs. mepilex dressing x 2 to left thigh/leg CDI. No numbness/tingling. Pedal pulses palpable. Pain 0/10. Alcala catheter in place draining clear, yellow urine. Patient was oriented to the room to include use of call bell, meal ordering, and use of incentive spirometer. Patient was given explanation of \" up for dinner\" program and has verbalized understanding. Phone and call bell left within reach. Plan of care for the day addressed with patient. Educated on pain medication availability and possible side effects.

## 2018-11-07 NOTE — ROUTINE PROCESS
TRANSFER - IN REPORT: 
 
Verbal report received from TASIA Monique RN(name) on Gerald Jones  being received from [place]) for routine progression of care Report consisted of patients Situation, Background, Assessment and  
Recommendations(SBAR). Information from the following report(s) SBAR, Kardex, Intake/Output and MAR was reviewed with the receiving nurse. Opportunity for questions and clarification was provided. Assessment completed upon patients arrival to unit and care assumed.

## 2018-11-07 NOTE — ROUTINE PROCESS
Bedside and Verbal shift change report given to DORINDA Jay RN (oncoming nurse) by [employer] RN (offgoing nurse). Report included the following information SBAR, Kardex, Intake/Output and MAR.

## 2018-11-07 NOTE — PROGRESS NOTES
Problem: Mobility Impaired (Adult and Pediatric) Goal: *Acute Goals and Plan of Care (Insert Text) Goals to be addressed in 1-4 days: STG 
1. Rolling L to R to L modified independent for positioning. 2. Supine to sit to supine S with HR for meals. 3. Sit to stand to sit S with RW in prep for ambulation. LTG 1. Ambulate >150ft S with RW, WBAT, for home/community mobility. 2. Ascend/descend a >3 stair steps CGA with HR for home entry. 3. Tolerate up in chair 1-2 hours for ADLs. 4. Patient/family to be independent with HEP for follow-up care and safe discharge. Outcome: Progressing Towards Goal 
physical Therapy TREATMENT Patient: Chanel Avila (67 y.o. female) Date: 11/7/2018 Diagnosis: OSTEOARTHRITIS LEFT HIP Post-traumatic osteoarthritis of one hip, left <principal problem not specified> Procedure(s) (LRB): LEFT TOTAL HIP ARTHROPLASTY ANTERIOR APPROACH AND REMOVAL OF EXISTING HARDWARE WITH C-ARM, \"SPEC POP\"  (Left) 1 Day Post-Op Precautions: Fall, WBAT Chart, physical therapy assessment, plan of care and goals were reviewed. PLOF: ambulatory with a RW per family ASSESSMENT: 
Pt to receive 1 unit of PRBC shortly, per nurse pt is asymptomatic. CGA to sit up EOB. No difficulty with sit to stand. Ambulated 80ft with RW, reciprocal gt pattern, increased pace and quick movements. No LOB or path deviations. Returned to supine in bed. Reducing Pt frequency to Q/BID Education:safety Progression toward goals: 
[x]      Improving appropriately and progressing toward goals 
[]      Improving slowly and progressing toward goals 
[]      Not making progress toward goals and plan of care will be adjusted PLAN: 
Patient continues to benefit from skilled intervention to address the above impairments. Continue treatment per established plan of care. Discharge Recommendations:  Home Health with supervision vs [facility] with [facility] Further Equipment Recommendations for Discharge:  rolling walker SUBJECTIVE:  
Patient stated . OBJECTIVE DATA SUMMARY:  
Critical Behavior: 
Neurologic State: Alert, Appropriate for age Functional Mobility Training: 
Bed Mobility: 
Supine to Sit: Contact guard assistance;Stand-by assistance Sit to Supine: Supervision Scooting: Supervision Transfers: 
Sit to Stand: Supervision Stand to Sit: Stand-by assistance Balance: 
Sitting: Intact Standing: Intact; With support Standing - Static: Good Standing - Dynamic : GoodAmbulation/Gait Training: 
Distance (ft): 80 Feet (ft) Assistive Device: Gait belt;Walker, rolling Ambulation - Level of Assistance: Contact guard assistance Gait Abnormalities: Antalgic Left Side Weight Bearing: As tolerated Base of Support: Shift to right Stance: Left decreased Speed/Donna: Accelerated Step Length: Left shortened;Right shortened [address] [facility] [id number] Current  CI= 1-19%   Goal  CI= 1-19%. The severity rating is based on the Level of Assistance required for Functional Mobility and ADLs. Pain: 
Pre:0 Post:0 Pain Scale 1: Adult Nonverbal Pain Scale Activity Tolerance:  
Good Please refer to the flowsheet for vital signs taken during this treatment. After treatment:  
[] Patient left in no apparent distress sitting up in chair 
[x] Patient left in no apparent distress in bed 
[x] Call bell left within reach [x] Nursing notified 
[] Caregiver present 
[] Bed alarm activated Morena Yadav PTA Time Calculation: 16 mins

## 2018-11-07 NOTE — PROGRESS NOTES
Reason for Admission:   LTHR 
                
RRAT Score:     12 Plan for utilizing home health:      tbd Likelihood of Readmission:   green Transition of Care Plan:    Chart reviewed, met with pt and daughter in room. Pt lives with family. Noted dc order, per daughter pt planning going to [facility]; referral placed. Pt does not speak [place], daughter is interpreting. CM following to finalize plan, if she clears PT for discharge home, [facility] Interventions PCP Verified by CM: Yes Transition of Care Consult (CM Consult): SNF Partner SNF: Yes Discharge Durable Medical Equipment: No 
Physical Therapy Consult: Yes Occupational Therapy Consult: Yes Current Support Network: Relative's Home Confirm Follow Up Transport: Family Plan discussed with Pt/Family/Caregiver: Yes Freedom of Choice Offered: Yes Discharge Location Discharge Placement: Skilled nursing facility

## 2018-11-08 LAB
GLUCOSE BLD STRIP.AUTO-MCNC: 136 MG/DL (ref 70–110)
GLUCOSE BLD STRIP.AUTO-MCNC: 138 MG/DL (ref 70–110)
GLUCOSE BLD STRIP.AUTO-MCNC: 156 MG/DL (ref 70–110)
GLUCOSE BLD STRIP.AUTO-MCNC: 157 MG/DL (ref 70–110)
HCT VFR BLD AUTO: 22.9 % (ref 35–45)
HCT VFR BLD AUTO: 31 % (ref 35–45)
HGB BLD-MCNC: 10.3 G/DL (ref 12–16)
HGB BLD-MCNC: 7.7 G/DL (ref 12–16)

## 2018-11-08 PROCEDURE — 74011250636 HC RX REV CODE- 250/636: Performed by: PHYSICIAN ASSISTANT

## 2018-11-08 PROCEDURE — 97535 SELF CARE MNGMENT TRAINING: CPT

## 2018-11-08 PROCEDURE — 65270000029 HC RM PRIVATE

## 2018-11-08 PROCEDURE — 36430 TRANSFUSION BLD/BLD COMPNT: CPT

## 2018-11-08 PROCEDURE — 97167 OT EVAL HIGH COMPLEX 60 MIN: CPT

## 2018-11-08 PROCEDURE — 74011636637 HC RX REV CODE- 636/637: Performed by: ORTHOPAEDIC SURGERY

## 2018-11-08 PROCEDURE — 77030037875 HC DRSG MEPILEX <16IN BORD MOLN -A

## 2018-11-08 PROCEDURE — 36415 COLL VENOUS BLD VENIPUNCTURE: CPT

## 2018-11-08 PROCEDURE — 74011250637 HC RX REV CODE- 250/637: Performed by: PHYSICIAN ASSISTANT

## 2018-11-08 PROCEDURE — P9016 RBC LEUKOCYTES REDUCED: HCPCS

## 2018-11-08 PROCEDURE — 85014 HEMATOCRIT: CPT

## 2018-11-08 PROCEDURE — 82962 GLUCOSE BLOOD TEST: CPT

## 2018-11-08 PROCEDURE — 97116 GAIT TRAINING THERAPY: CPT

## 2018-11-08 RX ORDER — ENOXAPARIN SODIUM 100 MG/ML
30 INJECTION SUBCUTANEOUS DAILY
Qty: 21 SYRINGE | Refills: 0 | Status: SHIPPED | OUTPATIENT
Start: 2018-11-09 | End: 2018-11-09

## 2018-11-08 RX ORDER — ACETAMINOPHEN 325 MG/1
650 TABLET ORAL
Status: DISCONTINUED | OUTPATIENT
Start: 2018-11-08 | End: 2018-11-09 | Stop reason: HOSPADM

## 2018-11-08 RX ORDER — SODIUM CHLORIDE 9 MG/ML
250 INJECTION, SOLUTION INTRAVENOUS AS NEEDED
Status: DISCONTINUED | OUTPATIENT
Start: 2018-11-08 | End: 2018-11-09 | Stop reason: HOSPADM

## 2018-11-08 RX ADMIN — ACETAMINOPHEN 650 MG: 325 TABLET ORAL at 21:08

## 2018-11-08 RX ADMIN — OXYCODONE HYDROCHLORIDE 10 MG: 5 TABLET ORAL at 13:41

## 2018-11-08 RX ADMIN — FERROUS SULFATE TAB 325 MG (65 MG ELEMENTAL FE) 325 MG: 325 (65 FE) TAB at 16:50

## 2018-11-08 RX ADMIN — DOCUSATE SODIUM 100 MG: 100 CAPSULE, LIQUID FILLED ORAL at 09:07

## 2018-11-08 RX ADMIN — ATORVASTATIN CALCIUM 40 MG: 20 TABLET, FILM COATED ORAL at 21:07

## 2018-11-08 RX ADMIN — DOCUSATE SODIUM 100 MG: 100 CAPSULE, LIQUID FILLED ORAL at 21:07

## 2018-11-08 RX ADMIN — FERROUS SULFATE TAB 325 MG (65 MG ELEMENTAL FE) 325 MG: 325 (65 FE) TAB at 09:07

## 2018-11-08 RX ADMIN — ENOXAPARIN SODIUM 30 MG: 40 INJECTION SUBCUTANEOUS at 04:23

## 2018-11-08 RX ADMIN — INSULIN LISPRO 2 UNITS: 100 INJECTION, SOLUTION INTRAVENOUS; SUBCUTANEOUS at 09:06

## 2018-11-08 RX ADMIN — ACETAMINOPHEN 650 MG: 325 TABLET ORAL at 16:50

## 2018-11-08 RX ADMIN — HYDROCHLOROTHIAZIDE 25 MG: 25 TABLET ORAL at 09:07

## 2018-11-08 RX ADMIN — OXYCODONE HYDROCHLORIDE 10 MG: 5 TABLET ORAL at 01:21

## 2018-11-08 RX ADMIN — INSULIN LISPRO 2 UNITS: 100 INJECTION, SOLUTION INTRAVENOUS; SUBCUTANEOUS at 18:40

## 2018-11-08 RX ADMIN — AMLODIPINE BESYLATE 5 MG: 5 TABLET ORAL at 21:08

## 2018-11-08 RX ADMIN — LOSARTAN POTASSIUM 100 MG: 50 TABLET ORAL at 09:07

## 2018-11-08 RX ADMIN — Medication 10 ML: at 22:08

## 2018-11-08 NOTE — PROGRESS NOTES
INITIAL NUTRITION ASSESSMENT  
RECOMMENDATIONS/PLAN:  
Add diet: Consistent Carb due to HgA1C 6.1 Commercial Beverage: Glucerna Shake BID Monitor labs/lytes, PO intake, BM, skin integrity, and nutritional status REASON FOR ASSESSMENT:  
 
[x] Positive Nutrition Screen: 
[x] BMI <19 NUTRITION ASSESSMENT:  
Client History: 66 yrs old Female admitted with s/p left total hip arthroplasty PMHx: DM, high cholesterol, HTN, osteoporosis, spinal stenosis Cultural/Muslim Food Preferences: None Identified FOOD/NUTRITION HISTORY Diet History: Spoke w pt's daughter. Daughter reported her appetite is good now and PTA. She stated that she has been eating most of all her meals. Daughter was unsure of weight loss. Food Allergies:  [x] NKFA Pertinent PTA Medications: Iron, mvi NUTRITION INTAKE Diet Order:  Regular Average PO Intake:      
Patient Vitals for the past 100 hrs: 
 % Diet Eaten 11/07/18 1800 75 % 11/07/18 1559 100 % 11/07/18 1300 100 % 11/07/18 0810 75 % 11/07/18 0627 0 % 11/07/18 0354 0 % 11/06/18 2249 0 % Pertinent Medications:  [x] Reviewed; Colace Electrolyte Replacement Protocol: []K  []Mg  []PO4 Insulin:  [] SSI  [x] Pre-meal   []  Basal   [] Drip  [] None Pt expected to meet estimated nutrient needs through next review:          [x]  Yes     [] No;  ANTHROPOMETRICS Height: 5' (152.4 cm)       Weight: 42.2 kg (93 lb 1 oz) BMI: 18.2 kg/m^2  -  underweight (Less than or = to 18.5% BMI) Weight change: Unable to obtain; No wt hx in chart or from pt family Comparison to Reference Standards: IBW: 100 lbs      %IBW: 93%      AdjBW: n/a NUTRITION-FOCUSED PHYSICAL ASSESSMENT Skin: No PU    
GI: +BM: 11/6/18 BIOCHEMICAL DATA & MEDICAL TESTS Pertinent Labs:  [x] Reviewed; GFR - 53, HgA1C - 6.1 NUTRITION PRESCRIPTION Calories: 1477 kcal/day based on 35 kcal/kg Protein: 63-84 g/day based on 1.5-2.0 g/kg Fluid: 1477 ml/day based on 1 kcal/ml NUTRITION DIAGNOSES:  
1. At risk for inadequate oral related to total hip arthroplasty as evidenced by MD note and BMI of 18.2 NUTRITION INTERVENTIONS:  
INTERVENTIONS:        GOALS: 
1. Add diet: Consistent Carb due to HgA1C 6.1 1. Add diet: Consistent Carb due to HgA1C 6.1 
by next review 5 days 2. Commercial Beverage: Glucerna Shake BID 2. Commercial Beverage: Glucerna Shake BID  by next review 5 days LEARNING NEEDS (Diet, Supplementation, Food/Nutrient-Drug Interaction):  
[x] None Identified 
[] Inpatient education provided/documented   
[] Identified and patient:  [] Declined     [] Was not appropriate/indicated NUTRITION MONITORING /EVALUATION:  
Follow PO intake Monitor wt Monitor renal labs, electrolytes, fluid status Monitor for additional supplement needs 
 
[] Participated in Interdisciplinary Rounds 
[x] [address] [place] [address] Reviewed/Documented DISCHARGE NUTRITION RECOMMENDATIONS ADDRESSED:  
  [x] To be determined closer to discharge NUTRITION RISK:     [x]  At risk                     []  Not currently at risk Will follow-up per policy. Mert Hewitt, Dietetic Intern [phone]

## 2018-11-08 NOTE — PROGRESS NOTES
Problem: Self Care Deficits Care Plan (Adult) Goal: *Acute Goals and Plan of Care (Insert Text) Initial Occupational Therapy Goals (11/8/2018) Within 7 day(s): 1. Patient will perform grooming standing sinkside with Supervision for increased independence in ADLs. 2. Patient will perform UB dressing with setup seated EOB for increased independence with ADLs. 3. Patient will perform LB dressing with setup/Andrew & A/E PRN for increased independence with ADLs. 4. Patient will perform all aspects of toileting with Supervision for increased independence with ADLs. 5. Patient will perform LB ADLs utilizing body mechanics & adaptive strategies with 1 verbal cue for increased safety in ADLs. 6. Patient will independently apply energy conservation techniques with 1 verbal cue(s)for increased independence with ADLs. Outcome: Resolved/Met Date Met: 11/08/18 Occupational Therapy EVALUATION/discharge Patient: Megan Lobo (2 y.o. female) Date: 11/8/2018 Primary Diagnosis: OSTEOARTHRITIS LEFT HIP Post-traumatic osteoarthritis of one hip, left Procedure(s) (LRB): LEFT TOTAL HIP ARTHROPLASTY ANTERIOR APPROACH AND REMOVAL OF EXISTING HARDWARE WITH C-ARM, \"SPEC POP\"  (Left) 2 Days Post-Op Precautions:  Fall, WBAT, Total hip(Anterior) ASSESSMENT AND RECOMMENDATIONS: 
Based on the objective data described below, the patient presents with ability to perform ADL tasks at baseline level. Patient able to sit EOB and reaching out for a hand w/ daughter attempting to assist. Encouraged pt to perform on own w/ noted pt not pulling on OT, but only using hand as guide. Pt Andrew for underwear d/t daughter helping, but while daughter talking to MD, pt able to ceci pants. Pt able to perform toileting w/o assistance and stand at sink for 4-5 minutes for oral care, hand hygiene, facial hygiene, and hair combing.  Pt eager to walk and walked in hallways past nursing station and wanting to walk more, but encouraged to return to room for breakfast. Pt w/ cultural barriers of non-English speaking and has had daughter at bedside throughout hospital stay assisting in care. Pt would best recover in home environment to minimize exposure to bacteria/viruses. Pt with no further OT/ADL concerns at this time. Education: Reviewed home safety, body mechanics, importance of moving every hour to prevent joint stiffness, role of ice for edema/pain control, Rolling Walker management/safety, and adaptive dressing techniques with patient verbalizing understanding at this time Discharge Recommendations: Home Health Further Equipment Recommendations for Discharge: shower chair and rolling walker SUBJECTIVE:  
Patient stated I walk? José Abad OBJECTIVE DATA SUMMARY:  
 
Past Medical History:  
Diagnosis Date  Arthritis  Chronic kidney disease   
 stage 3  
 Diabetes ([facility] [address] 2000  Hypertension 2014 Past Surgical History:  
Procedure Laterality Date  HX CERVICAL FUSION    
 HX LUMBAR FUSION    
 TOTAL HIP ARTHROPLASTY  2014  
 left Barriers to Learning/Limitations: yes;  cultural  and physical 
Compensate with: visual, verbal, tactile, kinesthetic cues/model Prior Level of Function/Home Situation: Pt has supportive family; culturally, pt appears fiercely independent for mobility but wanting family close to help if needed. Home Situation Home Environment: Private residence # Steps to Enter: 3 Rails to Enter: Yes Hand Rails : Bilateral 
One/Two Story Residence: Two story # of Interior Steps: 12 Interior Rails: Left Lift Chair Available: No 
Living Alone: Yes Support Systems: Child(cathryn) Patient Expects to be Discharged to[de-identified] Rehabilitation facility Current DME Used/Available at Home: Cj Wills, rollator Tub or Shower Type: Tub/Shower combination(on 2nd floor) [x]     Right hand dominant   []     Left hand dominant Cognitive/Behavioral Status: 
Neurologic State: Appropriate for age; Alert Orientation Level: Oriented X4 
 Cognition: Follows commands; Impulsive Safety/Judgement: Awareness of environment;Decreased insight into deficits Skin: L hip incision w/ Mepilex Edema: compression hose in place & applied ice Vision/Perceptual:   
 appears [facility] Coordination: 
Coordination: Within functional limits Fine Motor Skills-Upper: Left Intact; Right Intact Gross Motor Skills-Upper: Left Intact; Right Intact Balance: 
Sitting: Intact Standing: Intact; With support Strength: 
Strength: Generally decreased, functional 
Tone & Sensation: 
Tone: Normal 
Sensation: Intact Range of Motion: 
AROM: Generally decreased, functional 
PROM: Generally decreased, functional 
Functional Mobility and Transfers for ADLs: 
Bed Mobility: 
Supine to Sit: Stand-by assistance Scooting: Modified independent Transfers: 
Sit to Stand: Supervision Bed to Chair: Supervision Toilet Transfer : Supervision Bathroom Mobility: Supervision/set up ADL Assessment: 
Feeding: Setup Oral Facial Hygiene/Grooming: Supervision Bathing: Setup;Minimum assistance Upper Body Dressing: Setup Lower Body Dressing: Setup;Minimum assistance; Additional time Toileting: Supervision;Modified independent ADL Intervention: 
Feeding Feeding Assistance: Supervision/set-up Container Management: Supervision/set-up Cutting Food: Supervision/set-up Utensil Management: Supervision/set-up Food to Mouth: Supervision/set-up Drink to Mouth: Supervision/set-up Grooming Grooming Assistance: Supervision/set up;Modified independent Washing Face: Modified independent;Supervision/set-up Washing Hands: Modified independent;Supervision/set-up Brushing Teeth: Supervision/set-up; Modified independent Brushing/Combing Hair: Modified independent;Supervision/set-up Upper Body Dressing Assistance Pullover Shirt: Supervision/set-up Lower Body Dressing Assistance Underpants: Stand-by assistance;Minimum assistance(Daughter performing for her) Pants With Elastic Waist: Supervision/set-up; Stand-by assistance Socks: Moderate assistance(daughter performing for her) Position Performed: Seated edge of bed LE Adaptive Equipment: 
[x] Adaptive Equipment was not issued due pt able to complete with out use of AE and use of AE would prevent stretching needed to progress with recovery. (Pt able to complete utilizing forward flexion). (Pt able to complete w/ compensatory strategies and able to compensate for socks w/ clothing modifications, but will require assist with Compression hose). Toileting Toileting Assistance: Supervision/set up;Modified independent Bladder Hygiene: Modified independent Clothing Management: Modified independent Adaptive Equipment: Alek Garcia Cognitive Retraining Problem Solving: Inductive reason; Identifying the task; Identifying the problem;General alternative solution;Deductive reason; Awareness of environment Executive Functions: Executing cognitive plans Organizing/Sequencing: Breaking task down;Prioritizing Attention to Task: Distractibility Following Commands: Awareness of environment; Follows one step commands/directions Safety/Judgement: Awareness of environment;Decreased insight into deficits Pain: 
Pre-treatment: 3/10 Post-treatment: 3/10 Activity Tolerance:  
Patient able to stand 5+ minute(s). Patient able to complete ADLs without rest breaks. Pt requires cues to put heel down Please refer to the flowsheet for vital signs taken during this treatment. After treatment:  
[x]  Patient left in no apparent distress sitting up in chair 
[]  Patient left in no apparent distress in bed 
[x]  Call bell left within reach [x]  Nursing notified 
[x]  Caregiver present/daughter 
[]  Bed alarm activated COMMUNICATION/EDUCATION:  
Communication/Collaboration: 
[x]      Home safety education was provided and the patient/caregiver indicated understanding. [x]      Patient/family have participated as able and agree with findings and recommendations. []      Patient is unable to participate in plan of care at this time. Thank you for this referral. 
Melissa Gonsales, OTR/L Time Calculation: 23 mins G-Codes (GP) Self Care  Current  CJ= 20-39%  Goal  CJ= 20-39%  D/C  CJ= 20-39% The severity rating is based on the professional judgement & direct observation of Level of Assistance required for Functional Mobility and ADLs. Eval Complexity: History: HIGH Complexity : Extensive review of history including physical, cognitive and psychosocial history ; Examination: HIGH Complexity : 5 or more performance deficits relating to physical, cognitive , or psychosocial skils that result in activity limitations and / or participation restrictions; Decision Making:HIGH Complexity : Patient presents with comorbidities that affect occupational performance. Signifigant modification of tasks or assistance (eg, physical or verbal) with assessment (s) is necessary to enable patient to complete evaluation

## 2018-11-08 NOTE — PROGRESS NOTES
Progress Note Patient: Gerald Jones               Sex: female          DOA: 11/6/2018 YOB: 1940      Age:  66 y.o.        LOS:  LOS: 2 days Status Post: Procedure(s): LEFT TOTAL HIP ARTHROPLASTY ANTERIOR APPROACH AND REMOVAL OF EXISTING HARDWARE WITH C-ARM, \"SPEC POP\" Surgery Date: 11/6/2018 Subjective:  
 
Gerald Jones is a 66 y.o. female who c/o left hip pain reasonably well controlled with prn pain meds. No new changes. Objective:  
  
Visit Vitals /65 Pulse 77 Temp 98.4 °F (36.9 °C) Resp 15 Ht 5' (1.524 m) Wt 42.2 kg (93 lb 1 oz) SpO2 99% BMI 18.18 kg/m² Physical Exam:  
Dressing:  clean, dry, intact - same as POD # 1 Wiggles Toes/Ankle Foot sensation intact to light touch No foot edema/ +1 Posterior Tibial Pulse Intake and Output: 
Current Shift:  No intake/output data recorded. Last three shifts:  11/06 1901 - 11/08 0700 In: 4095.1 [P.O.:2580; I.V.:1453] Out: 5407 [JGIEU:3990] Voiding Status:  Voiding without need for a [place] Catheter Lab/Data Reviewed: 
Recent Labs 11/08/18 
0300 11/07/18 
0250 HGB 7.7* 6.3* HCT 22.9* 19.2*  
CREA  --  1.20 Medications Reviewed Assessment/Plan Active Problems: 
  Post-traumatic osteoarthritis of one hip, left (11/6/2018) · Discharge Planning for rehab · Continue DVT Prophylaxis. · D/C home today if cleared by physical therapy. The patient was seen and examined by Dr. Martir Collins today as well and he is in agreement with above.

## 2018-11-08 NOTE — PROGRESS NOTES
11/07/18 0810 Assessment  Type Assessment Type Shift assessment Reassessment Performed Changes documented below Activity and Safety Activity Level Up with Assistance Activity Family/Visitors present; In bed Activity Assistance Partial (one person) Weight Bearing Status WBAT (Weight Bearing as Tolerated) Mode of Transportation Wheelchair Repositioned Head of bed elevated (degrees) Patient Turned Turns self Head of Bed Elevated Self regulated Assistive Device Fall prevention device; Fredo Carter (comment) Safety Measures Bed/Chair-Wheels locked; Bed in low position;Call light within reach; Fall prevention (comment);Gripper socks; Side rails X2 Psychosocial  
Psychosocial (WDL) WDL Purposeful Interaction Yes Pt Identified Daily Priority Clinical issues (comment) Caring Interventions Reassure Reassure Therapeutic listening Caritas Process Nurture loving kindness Neuro Neurologic State Alert; Appropriate for age LUE Motor Response Spontaneous LLE Motor Response Purposeful;Weak RUE Motor Response Spontaneous RLE Motor Response Spontaneous RASS Sierra Agitation Sedation Scale (RASS) 0 Target RASS 0 Confusion Assessment Method (CAM) Acute Onset and Fluctuating Course (1A) No  
Confusion Assessment Method-ICU (CAM-ICU) Feature 1: Acute Onset or Fluctuating Course Negative EENT  
EENT (WDL) X Visual Aid Glasses Hearing Aid At home Visual Impairment None Respiratory Respiratory (WDL) WDL Incentive Spirometry Treatment Level of Service Assisted by nursing Predicted Volume (ml) 1000 ml Actual Volume (ml) 1000 ml  
% Predicted Volume (ml) 1 Treatment Tolerance Needs encouragement Cardiac Cardiac (WDL) X  
B/P Outside of Defined Limits Yes Cardiac/Telemetry Cardiac/Telemetry Monitor On No  
VTE Prophylaxis (Shift Required Documentation) Mechanical VTE Orders Yes Graduated Compression Stockings Bilateral  
 Sequential Compression Device Bilateral  
Peripheral Vascular Peripheral Vascular (WDL) WDL LLE Peripheral Vascular Capillary Refill Less than/equal to 3 seconds Color Appropriate for race Temperature Warm Sensation Present Pedal Pulse Present;Palpable Abdominal   
Abdominal (WDL) WDL Last Bowel Movement Date 11/06/18 Genitourinary Genitourinary (WDL) WDL Urine Assessment Urine Color Yellow/straw Urine Appearance Clear Urinary Status Voiding Urine Odor None Musculoskeletal  
LLE Surgery;Limited movement;Weakness Skin Integumentary Skin Integumentary (WDL) X Skin Condition/Temp Warm;Dry Skin Color Appropriate for ethnicity Skin Integrity Incision (comment) (L hip) Turgor Non-tenting Hair Growth Present Varicosities Absent Wound Prevention and Protection Methods Orientation of Wound Prevention Posterior Location of Wound Prevention Buttocks Dressing Present  No  
Wound Offloading (Prevention Methods) Bed, pressure redistribution/air;Pillows Neuro Neuro (WDL) WDL Pulmonary Toilet Pulmonary Toilet H. O.B elevated; Incentive Spirometry Musculoskeletal  
Musculoskeletal (WDL) X  
 
 
[id number] - Patient in bed at this time. A/O x 4. IV to L FA  intact and patent. TEDS and SCDs to BLE. Mepelex dressing to L hip CDI. Pt denies numbness/tingling. BLE pulses palpable. Lungs clear. Bowel sounds active to all quadrants. Patient able to get to 1000 on the incentive spirometer. Pain 5/10. Pt daughter speaks for mother. Daughter states \" she doesn't need pain medication\". Ice packs applied to surgical site. Will continue to monitor. Pt ambulating w/ PT. Pt voiding w/ no issues. Set up for blood transfusion. VS assessed. Tubing primed. Pt education provided. Picked up 1 unit of PRBC from lab. 1517 Transfusion of 1 unit of PRBC started at 75 mL/hr. Dual verification completed w/ Bruce Isaac RN. VS assessed for 15 minutes. No s/s of reaction noted. VS stable. Transfusion rate increased to 100 mL/hr. Will continue to monitor. Transfusion rate increased to 125 mL/hr. Pt VS assessed throughout transfusion. See flowsheet. Pt assisted to bathroom 3x during transfusion. No s/s of reaction or distress noted. 1800 Transfusion completed. VS assessed. No s/s of adverse reaction noted.

## 2018-11-08 NOTE — PROGRESS NOTES
Problem: Falls - Risk of 
Goal: *Absence of Falls Document Aleksandr Up Fall Risk and appropriate interventions in the flowsheet. Outcome: Progressing Towards Goal 
Fall Risk Interventions: 
Mobility Interventions: Patient to call before getting OOB, Utilize walker, cane, or other assistive device Mentation Interventions: Adequate sleep, hydration, pain control Medication Interventions: Assess postural VS orthostatic hypotension, Patient to call before getting OOB, Teach patient to arise slowly Elimination Interventions: Call light in reach, Patient to call for help with toileting needs

## 2018-11-08 NOTE — ROUTINE PROCESS
Bedside and Verbal shift change report given to Mari Junior RN by Wily Kendall RN. Report included the following information SBAR, Kardex, OR Summary, Intake/Output and MAR.

## 2018-11-08 NOTE — PROGRESS NOTES
19:45 Assessment completed. Lungs are clear bilat but are slightly decreased in the bases. Meplix dsg's on L hip remains C/D/I with + CMS & + PP. Continues to deny pain or discomfort in calves. Resting quietly in bed x for voiding per BR w/o difficulty. 22:55 Shift assess ment completed. See nsg flow sheet for details. 02:45 Reassessed with 0 changes noted. Mepilex dsg's on L hip remains C/D/I with + CMS & + PP. Resting quietly in bed with eyes closed between cares x for voiding per BR w/o difficulty. 06:45 Up in the chair @ bedside with call bell & phone within reach. 07:20 Bedside and Verbal shift change report given to Vonda Dowell RN (oncoming nurse) by Elsi Abebe RN (offgoing nurse). Report included the following information SBAR.

## 2018-11-08 NOTE — PROGRESS NOTES
0720: Received report from [place]. NIA EASON. Assumed pt care at this time. 0915: Assessment completed. Patient is A&OX4. Patient is calm and cooperative. Family at bedside. Patient's oral mucosa pink and moist, strong  on both upper extremities. Lung sound CLEAR, not appear distress. Bowel sounds ACTIVE. Pedal pulses are palpable, no complain of any numbness or tingling. IV site dry and dressing intact. MepilexX2 dressing to left hip is clean and dry, Ice is applied. SCD and Fernando hose are applied. Pain is 3/10.  bed is in lower position, call bell and personal items are within reach. Pain regimen and activities are educated, educated pt to call when getting up to prevent fall. Pt verbalized understanding. 1130: Ambulated back from the bathroom by herself, informed her that call when getting up to prevent falling. Blood transfusion start it at this time with [place]. PEYTON RN. Possible SE is educated. Pt is currently sitting in the bed and eating. 1145: Pt denied any trouble of breathing, VS is stable, currently eating and watching TV, no appear any distress or discomfort. Change infusion rate to 125mL/hr. Will continue to monitor pt's VS.  
 
1429: Blood transfusion completed, VS is stable. Assisted pt ambulated to the bathroom, tolerated well. Family at bedside. 1631: Pt daughter said that pt have fever, temp is taken at this time 100.1 oral. Encourage to changed the long sleeve to short sleeve cloth, and continue to use the IS. Encourage to use Ice pack and continue to drink plenty of water, Spoke with Citlali ELLSWORTH at this time. Ordered 650mg PRN Q6hrs.  
 
1939: Pt's Temp is 98.7. Orally. Denied HA and pain is in control.

## 2018-11-08 NOTE — PROGRESS NOTES
Plan: pt has been accepted for Friday 11/9 admission to [facility] at [address] [place], [place]. Report to [phone]. Please include all hard scripts for controlled substances, med rec and dc summary in packet. Please medicate for pain prior to dc if possible and needed to help offset delay when patient first arrives to facility. Pt daughter will transport to facility, is aware medical transport available, but pt may incur cost for transport. If pt clears PT for discharge home,  [facility]. Pt has RW for home. Facility [facility] to arrive at SNF no earlier than 2pm.  
 
Pt will need stable hgb 8 or above to transition to SNF.

## 2018-11-08 NOTE — PROGRESS NOTES
Problem: Mobility Impaired (Adult and Pediatric) Goal: *Acute Goals and Plan of Care (Insert Text) Goals to be addressed in 1-4 days: STG 
1. Rolling L to R to L modified independent for positioning. 2. Supine to sit to supine S with HR for meals. 3. Sit to stand to sit S with RW in prep for ambulation. LTG 1. Ambulate >150ft S with RW, WBAT, for home/community mobility. 2. Ascend/descend a >3 stair steps CGA with HR for home entry. 3. Tolerate up in chair 1-2 hours for ADLs. 4. Patient/family to be independent with HEP for follow-up care and safe discharge. Outcome: Progressing Towards Goal 
physical Therapy TREATMENT Patient: Megan Lobo (84 y.o. female) Date: 11/8/2018 Diagnosis: OSTEOARTHRITIS LEFT HIP Post-traumatic osteoarthritis of one hip, left <principal problem not specified> Procedure(s) (LRB): LEFT TOTAL HIP ARTHROPLASTY ANTERIOR APPROACH AND REMOVAL OF EXISTING HARDWARE WITH C-ARM, \"SPEC POP\"  (Left) 2 Days Post-Op Precautions: Fall, WBAT, Total hip(Anterior) Chart, physical therapy assessment, plan of care and goals were reviewed. PLOF: ambulatory with RW 
ASSESSMENT: 
Pt moves quickly and impulsive. No assistance needed to sit up EOB. Ambulated 70ft with RW, step to gt pattern, increased pace, no LOB or path deviations. Returned to supine in bed with Andrew for LLE, pt able scoot up to [facility] without assistance. Education: safety Progression toward goals: 
[]      Improving appropriately and progressing toward goals [x]      Improving slowly and progressing toward goals 
[]      Not making progress toward goals and plan of care will be adjusted PLAN: 
Patient continues to benefit from skilled intervention to address the above impairments. Continue treatment per established plan of care. Discharge Recommendations:  Terrell Ahn Further Equipment Recommendations for Discharge:  Has a RW SUBJECTIVE:  
Patient stated hurt.  OBJECTIVE DATA SUMMARY:  
 Critical Behavior: 
Neurologic State: Alert, Appropriate for age Orientation Level: Oriented X4, Appropriate for age Cognition: Follows commands Safety/Judgement: Awareness of environment, Decreased insight into deficits Functional Mobility Training: 
Bed Mobility: 
Supine to Sit: Contact guard assistance Sit to Supine: Minimum assistance Scooting: Modified independent Transfers: 
Sit to Stand: Contact guard assistance Stand to Sit: Contact guard assistance;Stand-by assistance Bed to Chair: Supervision Balance: 
Sitting: Intact Standing: Intact; With supportAmbulation/Gait Training: 
Distance (ft): 70 Feet (ft) Assistive Device: Gait belt;Walker, rolling Ambulation - Level of Assistance: Contact guard assistance Gait Abnormalities: Antalgic; Step to gait Left Side Weight Bearing: As tolerated [address] [place] [address] [id number] Current  CI= 1-19%   Goal  CI= 1-19%. The severity rating is based on the Level of Assistance required for Functional Mobility and ADLs. Pain: 
Pre:8 Post:9 Pain Scale 1: Numeric (0 - 10) Pain Location 1: Hip Pain Orientation 1: Left Pain Description 1: Aching Activity Tolerance:  
Fair Please refer to the flowsheet for vital signs taken during this treatment. After treatment:  
[] Patient left in no apparent distress sitting up in chair 
[x] Patient left in no apparent distress in bed 
[x] Call bell left within reach 
[] Nursing notified 
[x] Caregiver present 
[] Bed alarm activated Bora Otoole PTA Time Calculation: 14 mins

## 2018-11-09 VITALS
DIASTOLIC BLOOD PRESSURE: 98 MMHG | RESPIRATION RATE: 16 BRPM | TEMPERATURE: 98.3 F | BODY MASS INDEX: 18.27 KG/M2 | SYSTOLIC BLOOD PRESSURE: 118 MMHG | OXYGEN SATURATION: 100 % | WEIGHT: 93.06 LBS | HEIGHT: 60 IN | HEART RATE: 80 BPM

## 2018-11-09 LAB
ABO + RH BLD: NORMAL
BLD PROD TYP BPU: NORMAL
BLD PROD TYP BPU: NORMAL
BLOOD GROUP ANTIBODIES SERPL: NORMAL
BPU ID: NORMAL
BPU ID: NORMAL
CALLED TO:,BCALL1: NORMAL
CALLED TO:,BCALL2: NORMAL
CROSSMATCH RESULT,%XM: NORMAL
CROSSMATCH RESULT,%XM: NORMAL
GLUCOSE BLD STRIP.AUTO-MCNC: 131 MG/DL (ref 70–110)
HCT VFR BLD AUTO: 27.3 % (ref 35–45)
HGB BLD-MCNC: 9.2 G/DL (ref 12–16)
SPECIMEN EXP DATE BLD: NORMAL
STATUS OF UNIT,%ST: NORMAL
STATUS OF UNIT,%ST: NORMAL
UNIT DIVISION, %UDIV: 0
UNIT DIVISION, %UDIV: 0

## 2018-11-09 PROCEDURE — 36415 COLL VENOUS BLD VENIPUNCTURE: CPT

## 2018-11-09 PROCEDURE — 74011250637 HC RX REV CODE- 250/637: Performed by: PHYSICIAN ASSISTANT

## 2018-11-09 PROCEDURE — 74011250636 HC RX REV CODE- 250/636: Performed by: PHYSICIAN ASSISTANT

## 2018-11-09 PROCEDURE — 82962 GLUCOSE BLOOD TEST: CPT

## 2018-11-09 PROCEDURE — 85018 HEMOGLOBIN: CPT

## 2018-11-09 RX ORDER — ENOXAPARIN SODIUM 100 MG/ML
30 INJECTION SUBCUTANEOUS DAILY
Qty: 21 SYRINGE | Refills: 0 | Status: SHIPPED | OUTPATIENT
Start: 2018-11-09 | End: 2018-11-30

## 2018-11-09 RX ADMIN — DOCUSATE SODIUM 100 MG: 100 CAPSULE, LIQUID FILLED ORAL at 09:09

## 2018-11-09 RX ADMIN — ENOXAPARIN SODIUM 30 MG: 40 INJECTION SUBCUTANEOUS at 04:15

## 2018-11-09 RX ADMIN — ACETAMINOPHEN 650 MG: 325 TABLET ORAL at 05:52

## 2018-11-09 RX ADMIN — LOSARTAN POTASSIUM 100 MG: 50 TABLET ORAL at 09:09

## 2018-11-09 RX ADMIN — Medication 10 ML: at 05:07

## 2018-11-09 RX ADMIN — FERROUS SULFATE TAB 325 MG (65 MG ELEMENTAL FE) 325 MG: 325 (65 FE) TAB at 09:09

## 2018-11-09 RX ADMIN — HYDROCHLOROTHIAZIDE 25 MG: 25 TABLET ORAL at 09:09

## 2018-11-09 NOTE — PROGRESS NOTES
Progress Note Patient: Deisy Flores               Sex: female          DOA: 11/6/2018 YOB: 1940      Age:  66 y.o.        LOS:  LOS: 3 days Status Post: Procedure(s): LEFT TOTAL HIP ARTHROPLASTY ANTERIOR APPROACH AND REMOVAL OF EXISTING HARDWARE WITH C-ARM, \"SPEC POP\" Surgery Date: 11/6/2018 Subjective:  
 
Deisy Flores is a 66 y.o. female who c/o left hip pain reasonably well controlled. No new changes. Plans for d/c to rehab today. Objective:  
  
Visit Vitals /62 (BP 1 Location: Right arm, BP Patient Position: At rest) Pulse 80 Temp 98.4 °F (36.9 °C) Resp 16 Ht 5' (1.524 m) Wt 42.2 kg (93 lb 1 oz) SpO2 98% BMI 18.18 kg/m² Physical Exam:  
Dressing:  clean, dry, intact Wiggles Toes/Ankle Foot sensation intact to light touch No foot edema/ +1 Posterior Tibial Pulse Intake and Output: 
Current Shift:  11/08 1901 - 11/09 0700 In: 400 [P.O.:400] Out: 1500 [Urine:1500] Last three shifts:  11/07 0701 - 11/08 1900 In: 2445.1 [P.O.:2090] Out: 2850 [Urine:2850] Voiding Status:  Voiding without need for a [place] Catheter Lab/Data Reviewed: 
Recent Labs 11/09/18 
[id number]  11/07/18 
0250 HGB 9.2*   < > 6.3* HCT 27.3*   < > 19.2*  
CREA  --   --  1.20  
 < > = values in this interval not displayed. Medications Reviewed Assessment/Plan Active Problems: 
  Post-traumatic osteoarthritis of one hip, left (11/6/2018) · Discharge Planning for rehab today · Hgb 9.2, stable for d/c 
· Continue DVT Prophylaxis. · F/up in office in 2 weeks The patient was seen and examined by Dr. Betty Gibbs today as well and he is in agreement with above.

## 2018-11-09 NOTE — ROUTINE PROCESS
Bedside report received from Brayan Hayden. Patient in bed at this time. Plan of care for the day addressed with the patient. Care assumed at this time.

## 2018-11-09 NOTE — ROUTINE PROCESS
Bedside and Verbal shift change report given to Grisel KRAMER RN (oncoming nurse) by Mary Duncan. Michi Ley (offgoing nurse). Report included the following information SBAR, Kardex, Intake/Output, MAR and Alarm Parameters .

## 2018-11-09 NOTE — OP NOTES
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 POSTOPERATIVE DIAGNOSIS:  same    PROCEDURES PERFORMED:  Removal of retained hardware intertrochanteric fixation through separate incisions as well as left hip replacement through an anterior approach. SURGEON:  Bobbi Dougherty MD    ASSISTANT:  Kennedi Caldwell PA-c    ANESTHESIA:  General anesthesia. ESTIMATED BLOOD LOSS:  400 mL. SPECIMENS REMOVED:  Hard ware    INTRAVENOUS FLUIDS:  2000 mL. FINDINGS:  Nonunion retained intertrochanteric hardware of the left hip. COMPLICATIONS:  None. IMPLANTS:  Per implant log list including a size 48 mm Gription cup with a 32 liner, a size 5 ACTIS standard offset femoral component with a +5, 32 mm femoral head. OPERATIVE COURSE:  This is a 22-year-old that presented with increase in pain in the left hip and leg length discrepancy and x-rays noted cut out of the femoral head and an obvious nonunion of previous intertrochanteric fracture that was done in [place] 3 years prior. He has continued focal limitations of weightbearing activities because of this as well as increasing pain. We continued plans for conversion of this to total hip arthroplasty after appropriate preoperative evaluation and clearance was performed. On date of evaluation, risks and benefits were reviewed, consent was obtained from the patient per the patient's family. She was taken to surgery suite, placed in supine position undergone general anesthesia and placed on the [place] table for exposure. The left lower extremity was prepped posterior and anterior aspects to the level of the knee and was draped in routine fashion.   Utilizing fluoroscopy we made incisions in the distal portion of the proximal portion and gained accessed through the retained intramedullary fixation. Distal screws were removed without difficulty and the proximal portion locking screw was then removed and the lag screw was then removed through a lateral incision. The femoral nail portion was then removed from the canal.  This was done without disruption of the bone itself and maintained the lateral wall. This added to the complexity of the procedure for allowing additional time as well as additional exposures through separate incisions for the removal of the hardware. At this point, we continued with the plans for the conversion to total hip arthroplasty. Another incision was made in the anterior portion of the hip 1.5 cm and 3 cm lateral to the ASIS about 5 cm in length. Dissection down along the fascia varsha and retracting the muscle laterally. We gained access to the anterior portion of the anterior capsule of the femoral neck. Circumflex vessels were cauterized. Incision of the capsule noticed a redundant scar which was debrided. We freed up the medial aspect to gain access to the femoral portion. A small freshening cut was then made and then the remaining portions of the sclerotic and nonunion femoral head was then removed. We gained access to the acetabulum after additional resection of scar freeing up the femur to allow mild posterior translation, held in place with retractors. We continued with sequential reaming to a size 48 for the acetabulum. There was noted to be a large defect in the central portion where the lag screw had been cutting through. This was then debrided down to bleeding bone and then augmented with bone graft, both from the femoral head as well as a small, about 10 mL of graft on which was utilized as well. The periphery had good stability about the acetabular component which we selected a 48 mm cup. It was impacted in place with good initial stability.   Evaluation on fluoroscopy helped determine the correct orientation inversion. Once impacted into place, a 32 mm liner was then impacted and then we continued with gaining exposure of the femur. Additional resections and soft tissue releases had to be performed to gain exposure because of significant scar, presumptive from the longstanding nonunion. Once we gained access to the femoral portion, careful sequential broaching was then performed after cleaning the sclerotic margins from the previous fixation to decrease the risk of propagation of fracture. This took additional time as well. Once appropriate exposure of the canal was then performed, we continued with sequential broaching with the ACTIS femoral stem set to appropriate size 5 with good contact and bony secure fixation. Trial reductions were done gaining good stability with external rotation with a bone hook across the neck with a size 5 mm head ball. At this point, the trial components were removed. Irrigation was then performed of the canal and we selected a size 5 standard offset. ACTIS was impacted into place and then once again trialed the +5 with good overall leg length when compared to the contralateral side as well as stability. This was selected through the implanter which it was implanted and dried, prepped femoral trunnion. The hip was then control reduced again taken through external rotation for evaluation stability, which was noted to be good. We continued with copious irrigation with pulsatile lavage and bacitracin normal saline as well as reapproximation of the fascia varsha with running locked #1 Vicryl, subcutaneous closure with 2-0 Vicryl as well as a dermal Exofin bandage for the anterior incision. Additional incisions in the distal   portion of the lateral aspect of the flap and removal of hardware were also closed in similar fashion. Sterile dressings were placed over the wounds. She will continue with IV antibiotics for 24 hours as well as DVT prophylaxis with Lovenox.   She will be weightbearing as tolerated as noted above. All components of this procedure were increased difficulty because of the time obtained to gain access as well as there were limitations due to her previous injury and nonunion.       MD RICARDO Anderson / RAFAT  D: 11/09/2018 11:34     T: 11/09/2018 12:36  JOB #: [id number]

## 2018-11-09 NOTE — PROGRESS NOTES
Problem: Falls - Risk of 
Goal: *Absence of Falls Document Kim Norris Fall Risk and appropriate interventions in the flowsheet. Outcome: Progressing Towards Goal 
Fall Risk Interventions: 
Mobility Interventions: Bed/chair exit alarm, Patient to call before getting OOB, Utilize walker, cane, or other assistive device Mentation Interventions: Adequate sleep, hydration, pain control Medication Interventions: Assess postural VS orthostatic hypotension, Patient to call before getting OOB, Teach patient to arise slowly Elimination Interventions: Call light in reach, Patient to call for help with toileting needs History of Falls Interventions: Bed/chair exit alarm

## 2018-11-09 NOTE — PROGRESS NOTES
Problem: Falls - Risk of 
Goal: *Absence of Falls Document Fransico Wheatley Fall Risk and appropriate interventions in the flowsheet. Outcome: Progressing Towards Goal 
Fall Risk Interventions: 
Mobility Interventions: Patient to call before getting OOB, PT Consult for mobility concerns, PT Consult for assist device competence, Strengthening exercises (ROM-active/passive), Utilize walker, cane, or other assistive device Mentation Interventions: Adequate sleep, hydration, pain control, Increase mobility Medication Interventions: Assess postural VS orthostatic hypotension, Patient to call before getting OOB, Teach patient to arise slowly Elimination Interventions: Call light in reach, Patient to call for help with toileting needs, Toilet paper/wipes in reach, Toileting schedule/hourly rounds

## 2018-11-09 NOTE — PROGRESS NOTES
19:45  Assessment completed. Lungs are clear bilat. Mepilex dsg on L hip remains C/D/I with + CMS & + PP. Resting quietly in bed with with daughter  @ bedside. Voiding per BR w/o difficulty. 22:45 Shift assessment completed. See nsg flow sheet for details. 02:50 Reassessed with 0 changes noted. Mepilex dsg on L hip remains C/D/I with CMS & PP intact. Ice packs are in place. Voiding per BR w/o difficulty. 07:15 Bedside and Verbal shift change report given to Felicia Posadas RN  (oncoming nurse) by Pedro Pablo Mcguire RN (offgoing nurse). Report included the following information SBAR.

## 2018-11-09 NOTE — DISCHARGE SUMMARY
DISCHARGE SUMMARY    Patient: Chalo Green MRN: [id number]  CSN: [id number]    YOB: 1940  Age: 66 y.o. Sex: female              Admit Date: 11/6/2018    Discharge Date: 11/9/2018    Admission Diagnoses: OSTEOARTHRITIS LEFT HIP  Post-traumatic osteoarthritis of one hip, left    Discharge Diagnoses:    Problem List as of 11/9/2018 Never Reviewed          Codes Class Noted - Resolved    Post-traumatic osteoarthritis of one hip, left ICD-10-CM: M16.52  ICD-9-CM: 715.25  11/6/2018 - Present              Discharge Condition: Good    Hospital Course: On the day of admission the patient underwent a Removal of hardware and Left Total Hip Arthroplasty that was done under general anesthesia without complications. The patient was started on Lovenox 30 mg SQ daily postoperatively for anti embolism prophylaxis. The patient was voiding spontaneously without need for a [place] catheter. The patient was also started preoperatively on Ancef  that was continued for 2 more doses IV q8h for infection prophylaxis. Physical Therapy was begun postoperatively with patient weight bearing as tolerated. On POD # 1 patient's dressing was clean, dry and intact. Patient was able to wiggle their toes and ankle with sensation intact to light touch. The patient had no edema with a present 2+ posterior tibialis pulse. Patient's leg lengths appear equal. X-rays done of the patient's hip do show good positioning of the components. On POD #2 patient's physical exam was unchanged. Patient's incision was examined and looked good without signs or symptoms of infection. Patient's dressing was reapplied. Patient received a total of 2 units PRBC for ABL anemia. On POD # 3 the patient's physical exam remained unchanged. As long as they are orthopaedically and medically stable they will be discharged to Rehab.       Patients hemoglobins were   Recent Labs     11/09/18  0334 11/08/18  1716 11/08/18  0300 11/07/18  0250 11/06/18  1015   HGB 9.2* 10.3* 7.7* 6.3* 9.2*   . Patient temp max was. Temp (72hrs), Av.6 °F (37 °C), Min:97.1 °F (36.2 °C), Max:100.1 °F (37.8 °C)  . Discharge Medications:     Current Discharge Medication List      START taking these medications    Details   enoxaparin (LOVENOX) 30 mg/0.3 mL injection 0.3 mL by SubCUTAneous route daily for 21 days. Qty: 21 Syringe, Refills: 0      oxyCODONE IR (ROXICODONE) 10 mg tab immediate release tablet Take 1 Tab by mouth every four (4) hours as needed. Max Daily Amount: 60 mg.  Qty: 54 Tab, Refills: 0    Associated Diagnoses: S/P hip replacement, left      docusate sodium (COLACE) 100 mg capsule Take 1 Cap by mouth two (2) times a day for 30 days. Qty: 60 Cap, Refills: 0         CONTINUE these medications which have NOT CHANGED    Details   OTHER 1 Tab daily. ferrous sulfate ER (IRON) 160 mg (50 mg iron) TbER tablet Take 1 Tab by mouth daily. atorvastatin (LIPITOR) 40 mg tablet Take 40 mg by mouth nightly. losartan-hydroCHLOROthiazide (HYZAAR) 100-25 mg per tablet Take 1 Tab by mouth daily. amLODIPine (NORVASC) 5 mg tablet Take 5 mg by mouth nightly. acetaminophen (TYLENOL EXTRA STRENGTH) 500 mg tablet Take 500 mg by mouth every six (6) hours as needed for Pain.      multivitamin (ONE A DAY) tablet Take 1 Tab by mouth daily. exenatide microspheres (BYDUREON BCISE) 2 mg/0.85 mL atIn by SubCUTAneous route every seven (7) days. Activity: Activity as tolerated and PT/OT per Home Health  Patient has no hip precautions.     Diet: Regular Diet    Wound Care: Keep wound clean and dry, Reinforce dressing PRN and Ice to area for comfort    Follow-up: Two weeks post-op in the office with Dr. Kyung Gauthier for X-rays of the Left hip and another clinical exam.

## 2018-11-09 NOTE — BRIEF OP NOTE
BRIEF OPERATIVE NOTE Date of Procedure: 11/6/2018 Preoperative Diagnosis: OSTEOARTHRITIS LEFT HIP Postoperative Diagnosis: OSTEOARTHRITIS LEFT HIP Procedure(s): LEFT TOTAL HIP ARTHROPLASTY ANTERIOR APPROACH AND REMOVAL OF EXISTING HARDWARE WITH C-ARM, \"SPEC POP\" Surgeon(s) and Role: Fran Valle, DO - Primary Surgical Assistant: José Miguel Roca Surgical Staff: 
Circ-1: Kai Merino RN 
Circ-Relief: Isabel Singletary Physician Assistant: Justino Zapata PA-C Radiology Technician: Ami Echeverria Scrub Tech-2: Victor M Torres Scrub Tech-Relief: Elyssa Marie Float Staff: Sheron Reyes RN Event Time In Time Out Incision Start [phone] Incision Close 1632 Anesthesia: General  
Estimated Blood Loss: 400 ml 
IVF 2000 ml Specimens: * No specimens in log * Findings: non union retaine inter trochantaeric hardware Complications: none Implants:  
Implant Name Type Inv. Item Serial No.  Lot No. LRB No. Used Action GRAFT BNE SUB DEFGRAFT SM 10ML -- ORTHOBLEND - [id number]  GRAFT BNE SUB DEFGRAFT SM 10ML -- ORTHOBLEND [id number] [facility] TECH  Left 1 Implanted CUP ACET SECTOR GRIPTION 48MM -- TI - [id number]  CUP ACET SECTOR GRIPTION 48MM -- TI  [facility] [employer] [id number] Left 1 Implanted LINER ACET PINN NEUT 32X48 -- ALTRX - [id number]  LINER ACET PINN NEUT 32X48 -- ALTRX  [facility] ORTHOPEDICS [id number] Left 1 Implanted APEX HOLE ELIMINATOR -PS    [employer] [id number] Left 1 Implanted STEM FEM TAPR SZ5 STD OFFSET -- ACTIS - [id number]  STEM FEM TAPR SZ5 STD OFFSET -- ACTIS  [facility] ORTHOPEDICS [id number] Left 1 Implanted HEAD FEM CER 32MM +5MM NK -- DELTA - [id number]  HEAD FEM CER 32MM +5MM NK -- DELTA  [facility] [employer] [id number] Left 1 Implanted HIP AILEEN CERAMIC ON ALTRX -- H1.4 BSV - SCONSTRUCT  HIP AILEEN CERAMIC ON ALTRX -- H1.4 BSV CONSTRUCT [facility] [employer]TH[employer]EDICS  Left 1 Implanted

## (undated) DEVICE — PACK PROCEDURE SURG ANTR HIP

## (undated) DEVICE — REM POLYHESIVE ADULT PATIENT RETURN ELECTRODE: Brand: VALLEYLAB

## (undated) DEVICE — SOL IRR STRL H2O 1500ML BTL --

## (undated) DEVICE — HANDPIECE SET WITH FAN SPRAY TIP: Brand: INTERPULSE

## (undated) DEVICE — Device

## (undated) DEVICE — SUT VCRL + 1 36IN CT1 VIO --

## (undated) DEVICE — STERILE MEDICINE CUP 2 OZ KIT: Brand: CARDINAL HEALTH

## (undated) DEVICE — THE CANADY HYBRID PLASMA SCALPEL IS AN ELECTROSURGICAL PLASMA SCALPEL THAT USES AN 85MM BENDABLE PADDLE BLADE TIP. THE ELECTROSURGICAL PLASMA SCALPEL IS USED TO SIMULTANEOUSLY CUT AND COAGULATE BIOLOGICAL TISSUE.: Brand: CANADY HYBRID PLASMA PADDLE BLADE

## (undated) DEVICE — ADHESIVE SKIN CLOSURE 4X22 CM PREMIERPRO EXOFINFUSION DISP

## (undated) DEVICE — DRESSING FOAM SELF ADH 20X10 CM ABSORBENT MEPILEX BORDER

## (undated) DEVICE — STERILE POLYISOPRENE POWDER-FREE SURGICAL GLOVES: Brand: PROTEXIS

## (undated) DEVICE — BLADE SAW 1.27X13X90 MM FOR LG BNE

## (undated) DEVICE — (D)PREP SKN CHLRAPRP APPL 26ML -- CONVERT TO ITEM 371833

## (undated) DEVICE — STERILE POLYISOPRENE POWDER-FREE SURGICAL GLOVES WITH EMOLLIENT COATING: Brand: PROTEXIS

## (undated) DEVICE — SOL INJ L R 1000ML BG --

## (undated) DEVICE — KENDALL SCD EXPRESS SLEEVES, KNEE LENGTH, MEDIUM: Brand: KENDALL SCD

## (undated) DEVICE — SPONGE LAP 18X18IN STRL -- 5/PK

## (undated) DEVICE — SUT VCRL + 2-0 27IN CT2 UD -- 36/BX

## (undated) DEVICE — T5 HOOD WITH PEEL AWAY FACE SHIELD

## (undated) DEVICE — DRAPE TWL SURG 16X26IN BLU ORB04] ALLCARE INC]

## (undated) DEVICE — SYR LR LCK 1ML GRAD NSAF 30ML --

## (undated) DEVICE — NEEDLE SPNL 20GA L3.5IN YEL HUB S STL REG WALL FIT STYL W/

## (undated) DEVICE — SOL IRRIGATION INJ NACL 0.9% 500ML BTL

## (undated) DEVICE — DEVICE SECUREMENT AD W/ TRICOT ANCHR PD FOR F LTX SIL CATH

## (undated) DEVICE — BONE WAX WHITE: Brand: BONE WAX WHITE